# Patient Record
Sex: FEMALE | Race: WHITE | Employment: UNEMPLOYED | URBAN - METROPOLITAN AREA
[De-identification: names, ages, dates, MRNs, and addresses within clinical notes are randomized per-mention and may not be internally consistent; named-entity substitution may affect disease eponyms.]

---

## 2022-04-07 ENCOUNTER — TRANSCRIBE ORDER (OUTPATIENT)
Dept: SCHEDULING | Age: 37
End: 2022-04-07

## 2022-04-07 DIAGNOSIS — R07.9 CHEST PAIN, UNSPECIFIED: Primary | ICD-10-CM

## 2022-04-07 DIAGNOSIS — R22.2 LOCALIZED SWELLING, MASS AND LUMP, TRUNK: ICD-10-CM

## 2022-04-15 ENCOUNTER — HOSPITAL ENCOUNTER (OUTPATIENT)
Dept: CT IMAGING | Age: 37
Discharge: HOME | End: 2022-04-15
Attending: FAMILY MEDICINE

## 2022-04-15 DIAGNOSIS — R07.9 CHEST PAIN, UNSPECIFIED: ICD-10-CM

## 2022-04-15 DIAGNOSIS — R22.2 LOCALIZED SWELLING, MASS AND LUMP, TRUNK: ICD-10-CM

## 2022-11-03 ENCOUNTER — OFFICE VISIT (OUTPATIENT)
Dept: URGENT CARE | Age: 37
End: 2022-11-03
Payer: COMMERCIAL

## 2022-11-03 ENCOUNTER — APPOINTMENT (OUTPATIENT)
Dept: CT IMAGING | Age: 37
DRG: 872 | End: 2022-11-03
Attending: EMERGENCY MEDICINE
Payer: COMMERCIAL

## 2022-11-03 ENCOUNTER — HOSPITAL ENCOUNTER (INPATIENT)
Age: 37
LOS: 3 days | Discharge: HOME | DRG: 872 | End: 2022-11-07
Attending: EMERGENCY MEDICINE | Admitting: STUDENT IN AN ORGANIZED HEALTH CARE EDUCATION/TRAINING PROGRAM
Payer: COMMERCIAL

## 2022-11-03 VITALS
OXYGEN SATURATION: 99 % | DIASTOLIC BLOOD PRESSURE: 99 MMHG | SYSTOLIC BLOOD PRESSURE: 157 MMHG | TEMPERATURE: 97.6 F | WEIGHT: 170 LBS | HEART RATE: 103 BPM | BODY MASS INDEX: 28.29 KG/M2 | RESPIRATION RATE: 16 BRPM

## 2022-11-03 DIAGNOSIS — L03.114 LEFT ARM CELLULITIS: Primary | ICD-10-CM

## 2022-11-03 DIAGNOSIS — F19.11 HISTORY OF INTRAVENOUS DRUG ABUSE (HCC): ICD-10-CM

## 2022-11-03 DIAGNOSIS — R65.20 SEVERE SEPSIS (HCC): ICD-10-CM

## 2022-11-03 DIAGNOSIS — R60.0 ARM EDEMA: ICD-10-CM

## 2022-11-03 DIAGNOSIS — Z94.5 H/O SKIN GRAFT: ICD-10-CM

## 2022-11-03 DIAGNOSIS — A41.9 SEVERE SEPSIS (HCC): ICD-10-CM

## 2022-11-03 LAB
ALBUMIN SERPL-MCNC: 3.4 G/DL (ref 3.5–5)
ALBUMIN/GLOB SERPL: 0.6 {RATIO} (ref 1.1–2.2)
ALP SERPL-CCNC: 190 U/L (ref 45–117)
ALT SERPL-CCNC: 21 U/L (ref 12–78)
ANION GAP SERPL CALC-SCNC: 11 MMOL/L (ref 5–15)
AST SERPL-CCNC: 18 U/L (ref 15–37)
BASOPHILS # BLD: 0 K/UL (ref 0–0.1)
BASOPHILS NFR BLD: 0 % (ref 0–1)
BILIRUB SERPL-MCNC: 0.7 MG/DL (ref 0.2–1)
BUN SERPL-MCNC: 15 MG/DL (ref 6–20)
BUN/CREAT SERPL: 14 (ref 12–20)
CALCIUM SERPL-MCNC: 10.3 MG/DL (ref 8.5–10.1)
CHLORIDE SERPL-SCNC: 101 MMOL/L (ref 97–108)
CO2 SERPL-SCNC: 26 MMOL/L (ref 21–32)
COMMENT, HOLDF: NORMAL
CREAT SERPL-MCNC: 1.08 MG/DL (ref 0.55–1.02)
DIFFERENTIAL METHOD BLD: ABNORMAL
EOSINOPHIL # BLD: 0 K/UL (ref 0–0.4)
EOSINOPHIL NFR BLD: 0 % (ref 0–7)
ERYTHROCYTE [DISTWIDTH] IN BLOOD BY AUTOMATED COUNT: 17.7 % (ref 11.5–14.5)
GLOBULIN SER CALC-MCNC: 5.5 G/DL (ref 2–4)
GLUCOSE SERPL-MCNC: 127 MG/DL (ref 65–100)
HCT VFR BLD AUTO: 43.6 % (ref 35–47)
HGB BLD-MCNC: 14.2 G/DL (ref 11.5–16)
IMM GRANULOCYTES # BLD AUTO: 0.1 K/UL (ref 0–0.04)
IMM GRANULOCYTES NFR BLD AUTO: 0 % (ref 0–0.5)
LACTATE BLD-SCNC: 2.91 MMOL/L (ref 0.4–2)
LYMPHOCYTES # BLD: 1.3 K/UL (ref 0.8–3.5)
LYMPHOCYTES NFR BLD: 10 % (ref 12–49)
MCH RBC QN AUTO: 26.4 PG (ref 26–34)
MCHC RBC AUTO-ENTMCNC: 32.6 G/DL (ref 30–36.5)
MCV RBC AUTO: 81 FL (ref 80–99)
MONOCYTES # BLD: 0.4 K/UL (ref 0–1)
MONOCYTES NFR BLD: 3 % (ref 5–13)
NEUTS SEG # BLD: 10.3 K/UL (ref 1.8–8)
NEUTS SEG NFR BLD: 87 % (ref 32–75)
NRBC # BLD: 0 K/UL (ref 0–0.01)
NRBC BLD-RTO: 0 PER 100 WBC
PLATELET # BLD AUTO: 235 K/UL (ref 150–400)
PMV BLD AUTO: 11 FL (ref 8.9–12.9)
POTASSIUM SERPL-SCNC: 3.3 MMOL/L (ref 3.5–5.1)
PROT SERPL-MCNC: 8.9 G/DL (ref 6.4–8.2)
RBC # BLD AUTO: 5.38 M/UL (ref 3.8–5.2)
SAMPLES BEING HELD,HOLD: NORMAL
SODIUM SERPL-SCNC: 138 MMOL/L (ref 136–145)
WBC # BLD AUTO: 12.1 K/UL (ref 3.6–11)

## 2022-11-03 PROCEDURE — 87040 BLOOD CULTURE FOR BACTERIA: CPT

## 2022-11-03 PROCEDURE — 73201 CT UPPER EXTREMITY W/DYE: CPT

## 2022-11-03 PROCEDURE — 96365 THER/PROPH/DIAG IV INF INIT: CPT

## 2022-11-03 PROCEDURE — 99203 OFFICE O/P NEW LOW 30 MIN: CPT | Performed by: NURSE PRACTITIONER

## 2022-11-03 PROCEDURE — 83605 ASSAY OF LACTIC ACID: CPT

## 2022-11-03 PROCEDURE — 74011000258 HC RX REV CODE- 258: Performed by: EMERGENCY MEDICINE

## 2022-11-03 PROCEDURE — 96361 HYDRATE IV INFUSION ADD-ON: CPT

## 2022-11-03 PROCEDURE — 85025 COMPLETE CBC W/AUTO DIFF WBC: CPT

## 2022-11-03 PROCEDURE — 36415 COLL VENOUS BLD VENIPUNCTURE: CPT

## 2022-11-03 PROCEDURE — 93005 ELECTROCARDIOGRAM TRACING: CPT

## 2022-11-03 PROCEDURE — 96375 TX/PRO/DX INJ NEW DRUG ADDON: CPT

## 2022-11-03 PROCEDURE — 74011250636 HC RX REV CODE- 250/636: Performed by: EMERGENCY MEDICINE

## 2022-11-03 PROCEDURE — 74011000636 HC RX REV CODE- 636: Performed by: EMERGENCY MEDICINE

## 2022-11-03 PROCEDURE — 96367 TX/PROPH/DG ADDL SEQ IV INF: CPT

## 2022-11-03 PROCEDURE — 80053 COMPREHEN METABOLIC PANEL: CPT

## 2022-11-03 PROCEDURE — 99285 EMERGENCY DEPT VISIT HI MDM: CPT

## 2022-11-03 RX ORDER — MORPHINE SULFATE 2 MG/ML
4 INJECTION, SOLUTION INTRAMUSCULAR; INTRAVENOUS
Status: COMPLETED | OUTPATIENT
Start: 2022-11-03 | End: 2022-11-03

## 2022-11-03 RX ORDER — PROPRANOLOL HYDROCHLORIDE 10 MG/1
10 TABLET ORAL 3 TIMES DAILY
COMMUNITY

## 2022-11-03 RX ORDER — TRAZODONE HYDROCHLORIDE 150 MG/1
150 TABLET ORAL
COMMUNITY

## 2022-11-03 RX ORDER — VANCOMYCIN 1.75 GRAM/500 ML IN 0.9 % SODIUM CHLORIDE INTRAVENOUS
1750
Status: COMPLETED | OUTPATIENT
Start: 2022-11-03 | End: 2022-11-04

## 2022-11-03 RX ADMIN — IOPAMIDOL 100 ML: 755 INJECTION, SOLUTION INTRAVENOUS at 23:51

## 2022-11-03 RX ADMIN — MORPHINE SULFATE 4 MG: 2 INJECTION, SOLUTION INTRAMUSCULAR; INTRAVENOUS at 23:19

## 2022-11-03 RX ADMIN — VANCOMYCIN HYDROCHLORIDE 1750 MG: 10 INJECTION, POWDER, LYOPHILIZED, FOR SOLUTION INTRAVENOUS at 23:20

## 2022-11-03 RX ADMIN — PIPERACILLIN AND TAZOBACTAM 4.5 G: 4; .5 INJECTION, POWDER, FOR SOLUTION INTRAVENOUS at 20:47

## 2022-11-03 RX ADMIN — SODIUM CHLORIDE 1000 ML: 9 INJECTION, SOLUTION INTRAVENOUS at 20:47
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
## 2022-11-04 ENCOUNTER — TELEPHONE (OUTPATIENT)
Dept: SURGERY | Age: 37
End: 2022-11-04

## 2022-11-04 PROBLEM — L03.90 CELLULITIS: Status: ACTIVE | Noted: 2022-11-04

## 2022-11-04 PROBLEM — A41.9 SEPSIS (HCC): Status: ACTIVE | Noted: 2022-11-04

## 2022-11-04 LAB
ALBUMIN SERPL-MCNC: 2.4 G/DL (ref 3.5–5)
ALBUMIN/GLOB SERPL: 0.6 {RATIO} (ref 1.1–2.2)
ALP SERPL-CCNC: 134 U/L (ref 45–117)
ALT SERPL-CCNC: 16 U/L (ref 12–78)
ANION GAP SERPL CALC-SCNC: 7 MMOL/L (ref 5–15)
APPEARANCE UR: ABNORMAL
AST SERPL-CCNC: 14 U/L (ref 15–37)
ATRIAL RATE: 138 BPM
BACTERIA URNS QL MICRO: ABNORMAL /HPF
BILIRUB SERPL-MCNC: 0.5 MG/DL (ref 0.2–1)
BILIRUB UR QL CFM: NEGATIVE
BUN SERPL-MCNC: 12 MG/DL (ref 6–20)
BUN/CREAT SERPL: 16 (ref 12–20)
CALCIUM SERPL-MCNC: 8.6 MG/DL (ref 8.5–10.1)
CALCULATED P AXIS, ECG09: 76 DEGREES
CALCULATED R AXIS, ECG10: 77 DEGREES
CALCULATED T AXIS, ECG11: -75 DEGREES
CAOX CRY URNS QL MICRO: ABNORMAL
CHLORIDE SERPL-SCNC: 105 MMOL/L (ref 97–108)
CK SERPL-CCNC: 16 U/L (ref 26–192)
CO2 SERPL-SCNC: 26 MMOL/L (ref 21–32)
COLOR UR: ABNORMAL
CREAT SERPL-MCNC: 0.73 MG/DL (ref 0.55–1.02)
CRP SERPL HS-MCNC: >9.5 MG/L
DIAGNOSIS, 93000: NORMAL
EPITH CASTS URNS QL MICRO: ABNORMAL /LPF
ERYTHROCYTE [DISTWIDTH] IN BLOOD BY AUTOMATED COUNT: 17.8 % (ref 11.5–14.5)
ERYTHROCYTE [SEDIMENTATION RATE] IN BLOOD: 60 MM/HR (ref 0–20)
GLOBULIN SER CALC-MCNC: 3.9 G/DL (ref 2–4)
GLUCOSE BLD STRIP.AUTO-MCNC: 129 MG/DL (ref 65–117)
GLUCOSE SERPL-MCNC: 104 MG/DL (ref 65–100)
GLUCOSE UR STRIP.AUTO-MCNC: NEGATIVE MG/DL
HCT VFR BLD AUTO: 32.7 % (ref 35–47)
HGB BLD-MCNC: 10.7 G/DL (ref 11.5–16)
HGB UR QL STRIP: NEGATIVE
KETONES UR QL STRIP.AUTO: 15 MG/DL
LACTATE BLD-SCNC: 3.13 MMOL/L (ref 0.4–2)
LACTATE SERPL-SCNC: 1.6 MMOL/L (ref 0.4–2)
LACTATE SERPL-SCNC: 2.6 MMOL/L (ref 0.4–2)
LEUKOCYTE ESTERASE UR QL STRIP.AUTO: ABNORMAL
MAGNESIUM SERPL-MCNC: 1.6 MG/DL (ref 1.6–2.4)
MCH RBC QN AUTO: 26.4 PG (ref 26–34)
MCHC RBC AUTO-ENTMCNC: 32.7 G/DL (ref 30–36.5)
MCV RBC AUTO: 80.5 FL (ref 80–99)
NITRITE UR QL STRIP.AUTO: NEGATIVE
NRBC # BLD: 0 K/UL (ref 0–0.01)
NRBC BLD-RTO: 0 PER 100 WBC
P-R INTERVAL, ECG05: 130 MS
PH UR STRIP: 5 [PH] (ref 5–8)
PLATELET # BLD AUTO: 188 K/UL (ref 150–400)
PMV BLD AUTO: 10.3 FL (ref 8.9–12.9)
POTASSIUM SERPL-SCNC: 3.3 MMOL/L (ref 3.5–5.1)
PROCALCITONIN SERPL-MCNC: 39.41 NG/ML
PROT SERPL-MCNC: 6.3 G/DL (ref 6.4–8.2)
PROT UR STRIP-MCNC: 30 MG/DL
Q-T INTERVAL, ECG07: 268 MS
QRS DURATION, ECG06: 84 MS
QTC CALCULATION (BEZET), ECG08: 406 MS
RBC # BLD AUTO: 4.06 M/UL (ref 3.8–5.2)
RBC #/AREA URNS HPF: ABNORMAL /HPF (ref 0–5)
SERVICE CMNT-IMP: ABNORMAL
SODIUM SERPL-SCNC: 138 MMOL/L (ref 136–145)
SP GR UR REFRACTOMETRY: 1.03 (ref 1–1.03)
UA: UC IF INDICATED,UAUC: ABNORMAL
UROBILINOGEN UR QL STRIP.AUTO: 1 EU/DL (ref 0.2–1)
VENTRICULAR RATE, ECG03: 138 BPM
WBC # BLD AUTO: 8.8 K/UL (ref 3.6–11)
WBC URNS QL MICRO: ABNORMAL /HPF (ref 0–4)

## 2022-11-04 PROCEDURE — 81001 URINALYSIS AUTO W/SCOPE: CPT

## 2022-11-04 PROCEDURE — 80053 COMPREHEN METABOLIC PANEL: CPT

## 2022-11-04 PROCEDURE — 83605 ASSAY OF LACTIC ACID: CPT

## 2022-11-04 PROCEDURE — 74011000258 HC RX REV CODE- 258: Performed by: STUDENT IN AN ORGANIZED HEALTH CARE EDUCATION/TRAINING PROGRAM

## 2022-11-04 PROCEDURE — 99222 1ST HOSP IP/OBS MODERATE 55: CPT | Performed by: SURGERY

## 2022-11-04 PROCEDURE — 96366 THER/PROPH/DIAG IV INF ADDON: CPT

## 2022-11-04 PROCEDURE — 74011000250 HC RX REV CODE- 250: Performed by: STUDENT IN AN ORGANIZED HEALTH CARE EDUCATION/TRAINING PROGRAM

## 2022-11-04 PROCEDURE — 96375 TX/PRO/DX INJ NEW DRUG ADDON: CPT

## 2022-11-04 PROCEDURE — 74011250636 HC RX REV CODE- 250/636: Performed by: EMERGENCY MEDICINE

## 2022-11-04 PROCEDURE — 82550 ASSAY OF CK (CPK): CPT

## 2022-11-04 PROCEDURE — 84145 PROCALCITONIN (PCT): CPT

## 2022-11-04 PROCEDURE — 36415 COLL VENOUS BLD VENIPUNCTURE: CPT

## 2022-11-04 PROCEDURE — 74011250637 HC RX REV CODE- 250/637: Performed by: STUDENT IN AN ORGANIZED HEALTH CARE EDUCATION/TRAINING PROGRAM

## 2022-11-04 PROCEDURE — 74011000258 HC RX REV CODE- 258: Performed by: INTERNAL MEDICINE

## 2022-11-04 PROCEDURE — 74011250636 HC RX REV CODE- 250/636: Performed by: STUDENT IN AN ORGANIZED HEALTH CARE EDUCATION/TRAINING PROGRAM

## 2022-11-04 PROCEDURE — 83735 ASSAY OF MAGNESIUM: CPT

## 2022-11-04 PROCEDURE — 85652 RBC SED RATE AUTOMATED: CPT

## 2022-11-04 PROCEDURE — 74011250637 HC RX REV CODE- 250/637: Performed by: INTERNAL MEDICINE

## 2022-11-04 PROCEDURE — 86141 C-REACTIVE PROTEIN HS: CPT

## 2022-11-04 PROCEDURE — 85027 COMPLETE CBC AUTOMATED: CPT

## 2022-11-04 PROCEDURE — 82962 GLUCOSE BLOOD TEST: CPT

## 2022-11-04 PROCEDURE — 65270000029 HC RM PRIVATE

## 2022-11-04 PROCEDURE — 74011250636 HC RX REV CODE- 250/636: Performed by: INTERNAL MEDICINE

## 2022-11-04 PROCEDURE — 99223 1ST HOSP IP/OBS HIGH 75: CPT | Performed by: INTERNAL MEDICINE

## 2022-11-04 RX ORDER — KETOROLAC TROMETHAMINE 30 MG/ML
15 INJECTION, SOLUTION INTRAMUSCULAR; INTRAVENOUS
Status: DISPENSED | OUTPATIENT
Start: 2022-11-04 | End: 2022-11-07

## 2022-11-04 RX ORDER — ACETAMINOPHEN 325 MG/1
650 TABLET ORAL
Status: DISCONTINUED | OUTPATIENT
Start: 2022-11-04 | End: 2022-11-07 | Stop reason: HOSPADM

## 2022-11-04 RX ORDER — PROPRANOLOL HYDROCHLORIDE 10 MG/1
10 TABLET ORAL 3 TIMES DAILY
Status: DISCONTINUED | OUTPATIENT
Start: 2022-11-04 | End: 2022-11-07 | Stop reason: HOSPADM

## 2022-11-04 RX ORDER — SODIUM CHLORIDE 9 MG/ML
75 INJECTION, SOLUTION INTRAVENOUS CONTINUOUS
Status: DISPENSED | OUTPATIENT
Start: 2022-11-04 | End: 2022-11-04

## 2022-11-04 RX ORDER — HYDROMORPHONE HYDROCHLORIDE 1 MG/ML
0.5 INJECTION, SOLUTION INTRAMUSCULAR; INTRAVENOUS; SUBCUTANEOUS
Status: COMPLETED | OUTPATIENT
Start: 2022-11-04 | End: 2022-11-05

## 2022-11-04 RX ORDER — POTASSIUM CHLORIDE 750 MG/1
40 TABLET, FILM COATED, EXTENDED RELEASE ORAL
Status: COMPLETED | OUTPATIENT
Start: 2022-11-04 | End: 2022-11-04

## 2022-11-04 RX ORDER — SODIUM CHLORIDE, SODIUM LACTATE, POTASSIUM CHLORIDE, CALCIUM CHLORIDE 600; 310; 30; 20 MG/100ML; MG/100ML; MG/100ML; MG/100ML
1000 INJECTION, SOLUTION INTRAVENOUS ONCE
Status: COMPLETED | OUTPATIENT
Start: 2022-11-04 | End: 2022-11-04

## 2022-11-04 RX ORDER — HYDROMORPHONE HYDROCHLORIDE 1 MG/ML
1 INJECTION, SOLUTION INTRAMUSCULAR; INTRAVENOUS; SUBCUTANEOUS
Status: COMPLETED | OUTPATIENT
Start: 2022-11-04 | End: 2022-11-04

## 2022-11-04 RX ORDER — DULOXETIN HYDROCHLORIDE 30 MG/1
60 CAPSULE, DELAYED RELEASE ORAL DAILY
Status: DISCONTINUED | OUTPATIENT
Start: 2022-11-04 | End: 2022-11-07 | Stop reason: HOSPADM

## 2022-11-04 RX ORDER — DIPHENHYDRAMINE HYDROCHLORIDE 50 MG/ML
25 INJECTION, SOLUTION INTRAMUSCULAR; INTRAVENOUS
Status: COMPLETED | OUTPATIENT
Start: 2022-11-04 | End: 2022-11-04

## 2022-11-04 RX ADMIN — SODIUM CHLORIDE, PRESERVATIVE FREE 20 MG: 5 INJECTION INTRAVENOUS at 21:58

## 2022-11-04 RX ADMIN — CEFEPIME 2 G: 2 INJECTION, POWDER, FOR SOLUTION INTRAVENOUS at 03:21

## 2022-11-04 RX ADMIN — SODIUM CHLORIDE, POTASSIUM CHLORIDE, SODIUM LACTATE AND CALCIUM CHLORIDE 1000 ML: 600; 310; 30; 20 INJECTION, SOLUTION INTRAVENOUS at 02:15

## 2022-11-04 RX ADMIN — SODIUM CHLORIDE, PRESERVATIVE FREE 20 MG: 5 INJECTION INTRAVENOUS at 08:10

## 2022-11-04 RX ADMIN — PROPRANOLOL HYDROCHLORIDE 10 MG: 10 TABLET ORAL at 15:56

## 2022-11-04 RX ADMIN — METHYLPREDNISOLONE SODIUM SUCCINATE 125 MG: 125 INJECTION, POWDER, FOR SOLUTION INTRAMUSCULAR; INTRAVENOUS at 05:47

## 2022-11-04 RX ADMIN — HYDROMORPHONE HYDROCHLORIDE 1 MG: 1 INJECTION, SOLUTION INTRAMUSCULAR; INTRAVENOUS; SUBCUTANEOUS at 01:33

## 2022-11-04 RX ADMIN — KETOROLAC TROMETHAMINE 15 MG: 30 INJECTION, SOLUTION INTRAMUSCULAR; INTRAVENOUS at 19:28

## 2022-11-04 RX ADMIN — DIPHENHYDRAMINE HYDROCHLORIDE 25 MG: 50 INJECTION, SOLUTION INTRAMUSCULAR; INTRAVENOUS at 05:49

## 2022-11-04 RX ADMIN — HYDROMORPHONE HYDROCHLORIDE 0.5 MG: 1 INJECTION, SOLUTION INTRAMUSCULAR; INTRAVENOUS; SUBCUTANEOUS at 08:09

## 2022-11-04 RX ADMIN — KETOROLAC TROMETHAMINE 15 MG: 30 INJECTION, SOLUTION INTRAMUSCULAR; INTRAVENOUS at 03:28

## 2022-11-04 RX ADMIN — KETOROLAC TROMETHAMINE 15 MG: 30 INJECTION, SOLUTION INTRAMUSCULAR; INTRAVENOUS at 12:00

## 2022-11-04 RX ADMIN — TRAZODONE HYDROCHLORIDE 150 MG: 100 TABLET ORAL at 03:40

## 2022-11-04 RX ADMIN — VANCOMYCIN HYDROCHLORIDE 1000 MG: 1 INJECTION, POWDER, LYOPHILIZED, FOR SOLUTION INTRAVENOUS at 11:40

## 2022-11-04 RX ADMIN — POTASSIUM CHLORIDE 40 MEQ: 750 TABLET, FILM COATED, EXTENDED RELEASE ORAL at 05:38

## 2022-11-04 RX ADMIN — SODIUM CHLORIDE, PRESERVATIVE FREE 20 MG: 5 INJECTION INTRAVENOUS at 03:29

## 2022-11-04 RX ADMIN — SODIUM CHLORIDE 75 ML/HR: 9 INJECTION, SOLUTION INTRAVENOUS at 04:29

## 2022-11-04 RX ADMIN — TRAZODONE HYDROCHLORIDE 150 MG: 100 TABLET ORAL at 21:59

## 2022-11-04 RX ADMIN — DULOXETINE HYDROCHLORIDE 60 MG: 30 CAPSULE, DELAYED RELEASE ORAL at 09:29

## 2022-11-04 RX ADMIN — PROPRANOLOL HYDROCHLORIDE 10 MG: 10 TABLET ORAL at 21:58

## 2022-11-04 RX ADMIN — CEFEPIME 2 G: 2 INJECTION, POWDER, FOR SOLUTION INTRAVENOUS at 13:10

## 2022-11-04 RX ADMIN — PROPRANOLOL HYDROCHLORIDE 10 MG: 10 TABLET ORAL at 09:29

## 2022-11-04 RX ADMIN — PIPERACILLIN AND TAZOBACTAM 3.38 G: 3; .375 INJECTION, POWDER, FOR SOLUTION INTRAVENOUS at 19:28

## 2022-11-04 RX ADMIN — HYDROMORPHONE HYDROCHLORIDE 0.5 MG: 1 INJECTION, SOLUTION INTRAMUSCULAR; INTRAVENOUS; SUBCUTANEOUS at 16:04

## 2022-11-04 NOTE — PROGRESS NOTES
Pharmacy Antimicrobial Kinetic Dosing    Indication for Antimicrobials:  cellulitis, sepsis    Current Regimen of Each Antimicrobial:  Cefepime 2 gm IV q8h (Start Date 11/3; Day # 1)  Vancomycin 1750 mg IV once, then 1000 mg IV q12h (Start Date 11/3; Day # 1)    Previous Antimicrobial Therapy:  Zosyn 4.5 gm x1 dose 11/3    Goal Level: AUC: 400-600 mg/hr/Liter/day    Date Dose & Interval Measured (mcg/mL) Predicted AUC/CURT                       Dosing calculator used: [employer] calculator    Significant Positive Cultures:   11/3 blood: pending    Labs:  Recent Labs     22   CREA 1.08*   BUN 15     Recent Labs     22   WBC 12.1*     Temp (24hrs), Av.1 °F (36.7 °C), Min:97.6 °F (36.4 °C), Max:98.4 °F (36.9 °C)    Creatinine Clearance (mL/min):   CrCl (Adjusted Body Weight): 75.3 mL/min    Impression/Plan:   Cefepime 2 gm IV q8h per extended infusion beta lactam protocol   Vancomycin 1750 mg loading dose, followed by 1000 mg IV q12h (predicted , predicted trough 17.4 mcg/ml)  Antimicrobial stop date TBD     Pharmacy will follow daily and adjust medications as appropriate for renal function and/or serum levels.     Thank you,  Mariela Bailey, PHARMD

## 2022-11-04 NOTE — DISCHARGE INSTRUCTIONS
DISCHARGE DIAGNOSIS:  Acute sepsis, likely secondary to arm cellulitis -   Acute hypokalemia  IV drug abuse  Hypertension      MEDICATIONS:  It is important that you take the medication exactly as they are prescribed. Keep your medication in the bottles provided by the pharmacist and keep a list of the medication names, dosages, and times to be taken in your wallet. Do not take other medications without consulting your doctor. Pain Management: per above medications    What to do at Home    Recommended diet:  Regular Diet    Recommended activity: Activity as tolerated  Daily Wound care for the left forearm:  Remove the old dressings and note / document the amount and type of drainage on the dressings. Cleanse the wounds with Caraklenz spray and wipe with gauze. Apply NS moist pieces of the Opticel Ag placed into the open wounds and then cover with two ABD pads. Wrap with Small roll karyna and then an ACE bandage. Elevate the arm to decrease the edema. If you have questions regarding the hospital related prescriptions or hospital related issues please call [address] [place] [address] at . You can always direct your questions to your primary care doctor if you are unable to reach your hospital physician; your PCP works as an extension of your hospital doctor just like your hospital doctor is an extension of your PCP for your time at the hospital [facility], [facility]).     If you experience any of the following symptoms then please call your primary care physician or return to the emergency room if you cannot get hold of your doctor:  Fever, chills, nausea, vomiting, diarrhea, change in mentation, falling, bleeding, shortness of breath

## 2022-11-04 NOTE — PROGRESS NOTES
TRANSFER - OUT REPORT:    Verbal report given to YUMI Norton(name) on Joey Vargas  being transferred to surg tele(unit) for routine progression of care       Report consisted of patients Situation, Background, Assessment and   Recommendations(SBAR). Information from the following report(s) SBAR, Kardex, Procedure Summary, Intake/Output, MAR, Accordion, and Recent Results was reviewed with the receiving nurse. Lines:   Peripheral IV 11/03/22 Right Antecubital (Active)   Site Assessment Clean, dry, & intact 11/03/22 2210   Phlebitis Assessment 0 11/03/22 2210   Infiltration Assessment 0 11/03/22 2210   Dressing Status Clean;Dry; Intact 11/03/22 2210   Dressing Type Tape;Transparent 11/03/22 1931   Hub Color/Line Status Infusing;Capped 11/03/22 2210   Action Taken Blood drawn 11/03/22 1931        Opportunity for questions and clarification was provided.       Patient transported with:   [employer]othing
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
## 2022-11-04 NOTE — PROGRESS NOTES
Physical Therapy    Received PT eval order, chart reviewed. Pt up ad shlomo in ED and without mobility issues. Pt has no concerns from PT standpoint. Will sign off.     Ryan  PT

## 2022-11-04 NOTE — TELEPHONE ENCOUNTER
Attempted to reach ER nurse Celina Viveros re: consult. No answer. Dr. Niki Longo is in clinic and will see pt between morning and afternoon clinic.
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
## 2022-11-04 NOTE — PROGRESS NOTES
TRANSFER - IN REPORT:    Verbal report received from Kati Corona RN(name) on Daja Reagan  being received from ED(unit) for routine progression of care      Report consisted of patients Situation, Background, Assessment and   Recommendations(SBAR). Information from the following report(s) ED Summary, Procedure Summary, and Intake/Output was reviewed with the receiving nurse. Opportunity for questions and clarification was provided. Assessment completed upon patients arrival to unit and care assumed.

## 2022-11-04 NOTE — ED NOTES
SBAR given to oncoming shift nurse by this RN. All questions answered at this time. Care transitioned.

## 2022-11-04 NOTE — PROGRESS NOTES
Chart reviewed. 32 yo F adm for sepsis from arm cellulitis. She states swelling is down at time of encounter and she is able to move her fingers on R hand better. Discussed plan for surgery and ID evaluation and she is agreeable.
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
## 2022-11-04 NOTE — PROGRESS NOTES
Occupational Therapy  11/4/2022    Orders received and chart reviewed. Screened pt for OT evaluation. Pt up ad shlomo in room and without concerns for safety in ADLs. Discussed outpatient hand therapy as a future option if necessary with pt indicating understanding. Pt without needs for skilled therapy in the acute care setting. Please reconsult if functional changes arise.      Thank you,   Queen Domi, OTR/L
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
## 2022-11-05 LAB
ALBUMIN SERPL-MCNC: 2.5 G/DL (ref 3.5–5)
ALBUMIN/GLOB SERPL: 0.6 {RATIO} (ref 1.1–2.2)
ALP SERPL-CCNC: 141 U/L (ref 45–117)
ALT SERPL-CCNC: 16 U/L (ref 12–78)
ANION GAP SERPL CALC-SCNC: 5 MMOL/L (ref 5–15)
AST SERPL-CCNC: 20 U/L (ref 15–37)
BASOPHILS # BLD: 0 K/UL (ref 0–0.1)
BASOPHILS NFR BLD: 0 % (ref 0–1)
BILIRUB SERPL-MCNC: 1.1 MG/DL (ref 0.2–1)
BUN SERPL-MCNC: 12 MG/DL (ref 6–20)
BUN/CREAT SERPL: 16 (ref 12–20)
CALCIUM SERPL-MCNC: 8.9 MG/DL (ref 8.5–10.1)
CHLORIDE SERPL-SCNC: 110 MMOL/L (ref 97–108)
CO2 SERPL-SCNC: 24 MMOL/L (ref 21–32)
CREAT SERPL-MCNC: 0.75 MG/DL (ref 0.55–1.02)
DIFFERENTIAL METHOD BLD: ABNORMAL
EOSINOPHIL # BLD: 0 K/UL (ref 0–0.4)
EOSINOPHIL NFR BLD: 0 % (ref 0–7)
ERYTHROCYTE [DISTWIDTH] IN BLOOD BY AUTOMATED COUNT: 18.5 % (ref 11.5–14.5)
ERYTHROCYTE [DISTWIDTH] IN BLOOD BY AUTOMATED COUNT: 18.8 % (ref 11.5–14.5)
GLOBULIN SER CALC-MCNC: 4.3 G/DL (ref 2–4)
GLUCOSE SERPL-MCNC: 121 MG/DL (ref 65–100)
HCT VFR BLD AUTO: 31.5 % (ref 35–47)
HCT VFR BLD AUTO: 32.1 % (ref 35–47)
HGB BLD-MCNC: 10.2 G/DL (ref 11.5–16)
HGB BLD-MCNC: 10.5 G/DL (ref 11.5–16)
IMM GRANULOCYTES # BLD AUTO: 0.1 K/UL (ref 0–0.04)
IMM GRANULOCYTES NFR BLD AUTO: 1 % (ref 0–0.5)
LYMPHOCYTES # BLD: 1.9 K/UL (ref 0.8–3.5)
LYMPHOCYTES NFR BLD: 18 % (ref 12–49)
MCH RBC QN AUTO: 26.1 PG (ref 26–34)
MCH RBC QN AUTO: 26.3 PG (ref 26–34)
MCHC RBC AUTO-ENTMCNC: 32.4 G/DL (ref 30–36.5)
MCHC RBC AUTO-ENTMCNC: 32.7 G/DL (ref 30–36.5)
MCV RBC AUTO: 80.3 FL (ref 80–99)
MCV RBC AUTO: 80.6 FL (ref 80–99)
MONOCYTES # BLD: 0.5 K/UL (ref 0–1)
MONOCYTES NFR BLD: 4 % (ref 5–13)
NEUTS SEG # BLD: 8.3 K/UL (ref 1.8–8)
NEUTS SEG NFR BLD: 77 % (ref 32–75)
NRBC # BLD: 0 K/UL (ref 0–0.01)
NRBC # BLD: 0 K/UL (ref 0–0.01)
NRBC BLD-RTO: 0 PER 100 WBC
NRBC BLD-RTO: 0 PER 100 WBC
PLATELET # BLD AUTO: 236 K/UL (ref 150–400)
PLATELET # BLD AUTO: 253 K/UL (ref 150–400)
PMV BLD AUTO: 10.1 FL (ref 8.9–12.9)
PMV BLD AUTO: 9.8 FL (ref 8.9–12.9)
POTASSIUM SERPL-SCNC: 4.4 MMOL/L (ref 3.5–5.1)
PROT SERPL-MCNC: 6.8 G/DL (ref 6.4–8.2)
RBC # BLD AUTO: 3.91 M/UL (ref 3.8–5.2)
RBC # BLD AUTO: 4 M/UL (ref 3.8–5.2)
SODIUM SERPL-SCNC: 139 MMOL/L (ref 136–145)
WBC # BLD AUTO: 10.7 K/UL (ref 3.6–11)
WBC # BLD AUTO: 12.6 K/UL (ref 3.6–11)

## 2022-11-05 PROCEDURE — 74011250637 HC RX REV CODE- 250/637: Performed by: INTERNAL MEDICINE

## 2022-11-05 PROCEDURE — 80053 COMPREHEN METABOLIC PANEL: CPT

## 2022-11-05 PROCEDURE — 65270000029 HC RM PRIVATE

## 2022-11-05 PROCEDURE — 74011250636 HC RX REV CODE- 250/636: Performed by: STUDENT IN AN ORGANIZED HEALTH CARE EDUCATION/TRAINING PROGRAM

## 2022-11-05 PROCEDURE — 74011250636 HC RX REV CODE- 250/636: Performed by: INTERNAL MEDICINE

## 2022-11-05 PROCEDURE — 85025 COMPLETE CBC W/AUTO DIFF WBC: CPT

## 2022-11-05 PROCEDURE — 76937 US GUIDE VASCULAR ACCESS: CPT

## 2022-11-05 PROCEDURE — 74011000258 HC RX REV CODE- 258: Performed by: INTERNAL MEDICINE

## 2022-11-05 PROCEDURE — 85027 COMPLETE CBC AUTOMATED: CPT

## 2022-11-05 PROCEDURE — 74011250637 HC RX REV CODE- 250/637: Performed by: STUDENT IN AN ORGANIZED HEALTH CARE EDUCATION/TRAINING PROGRAM

## 2022-11-05 PROCEDURE — 36415 COLL VENOUS BLD VENIPUNCTURE: CPT

## 2022-11-05 PROCEDURE — 99231 SBSQ HOSP IP/OBS SF/LOW 25: CPT | Performed by: SURGERY

## 2022-11-05 PROCEDURE — 74011000250 HC RX REV CODE- 250: Performed by: STUDENT IN AN ORGANIZED HEALTH CARE EDUCATION/TRAINING PROGRAM

## 2022-11-05 RX ORDER — DIPHENHYDRAMINE HCL 25 MG
25 CAPSULE ORAL
Status: DISCONTINUED | OUTPATIENT
Start: 2022-11-05 | End: 2022-11-06

## 2022-11-05 RX ORDER — DULOXETIN HYDROCHLORIDE 60 MG/1
60 CAPSULE, DELAYED RELEASE ORAL DAILY
COMMUNITY
End: 2022-11-14

## 2022-11-05 RX ADMIN — DIPHENHYDRAMINE HYDROCHLORIDE 25 MG: 25 CAPSULE ORAL at 13:18

## 2022-11-05 RX ADMIN — HYDROMORPHONE HYDROCHLORIDE 0.5 MG: 1 INJECTION, SOLUTION INTRAMUSCULAR; INTRAVENOUS; SUBCUTANEOUS at 00:08

## 2022-11-05 RX ADMIN — KETOROLAC TROMETHAMINE 15 MG: 30 INJECTION, SOLUTION INTRAMUSCULAR; INTRAVENOUS at 10:03

## 2022-11-05 RX ADMIN — VANCOMYCIN HYDROCHLORIDE 1000 MG: 1 INJECTION, POWDER, LYOPHILIZED, FOR SOLUTION INTRAVENOUS at 10:00

## 2022-11-05 RX ADMIN — PIPERACILLIN AND TAZOBACTAM 3.38 G: 3; .375 INJECTION, POWDER, FOR SOLUTION INTRAVENOUS at 04:44

## 2022-11-05 RX ADMIN — PROPRANOLOL HYDROCHLORIDE 10 MG: 10 TABLET ORAL at 15:50

## 2022-11-05 RX ADMIN — PROPRANOLOL HYDROCHLORIDE 10 MG: 10 TABLET ORAL at 09:57

## 2022-11-05 RX ADMIN — SODIUM CHLORIDE, PRESERVATIVE FREE 20 MG: 5 INJECTION INTRAVENOUS at 22:09

## 2022-11-05 RX ADMIN — PROPRANOLOL HYDROCHLORIDE 10 MG: 10 TABLET ORAL at 21:59

## 2022-11-05 RX ADMIN — SODIUM CHLORIDE, PRESERVATIVE FREE 20 MG: 5 INJECTION INTRAVENOUS at 09:57

## 2022-11-05 RX ADMIN — PIPERACILLIN AND TAZOBACTAM 3.38 G: 3; .375 INJECTION, POWDER, FOR SOLUTION INTRAVENOUS at 12:33

## 2022-11-05 RX ADMIN — VANCOMYCIN HYDROCHLORIDE 1000 MG: 1 INJECTION, POWDER, LYOPHILIZED, FOR SOLUTION INTRAVENOUS at 00:08

## 2022-11-05 RX ADMIN — Medication 1 CAPSULE: at 09:57

## 2022-11-05 RX ADMIN — DULOXETINE HYDROCHLORIDE 60 MG: 30 CAPSULE, DELAYED RELEASE ORAL at 09:57

## 2022-11-05 RX ADMIN — PIPERACILLIN AND TAZOBACTAM 3.38 G: 3; .375 INJECTION, POWDER, FOR SOLUTION INTRAVENOUS at 19:35

## 2022-11-05 RX ADMIN — KETOROLAC TROMETHAMINE 15 MG: 30 INJECTION, SOLUTION INTRAMUSCULAR; INTRAVENOUS at 22:21

## 2022-11-05 RX ADMIN — KETOROLAC TROMETHAMINE 15 MG: 30 INJECTION, SOLUTION INTRAMUSCULAR; INTRAVENOUS at 15:50

## 2022-11-05 RX ADMIN — TRAZODONE HYDROCHLORIDE 150 MG: 100 TABLET ORAL at 22:01

## 2022-11-05 NOTE — PROGRESS NOTES
SURGERY PROGRESS NOTE      Admit Date: 11/3/2022    POD * No surgery found *    Procedure: * No surgery found *      Subjective:     Patient has no complaints. However, when asked to show me her infected arm, she raised her right arm and stated it was better. Objective:     Visit Vitals  /77   Pulse 75   Temp 97.6 °F (36.4 °C)   Resp 18   Ht 5' 5\" (1.651 m)   Wt 77 kg (169 lb 12.1 oz)   LMP 10/08/2022   SpO2 100%   Breastfeeding No   BMI 28.25 kg/m²        Temp (24hrs), Av °F (36.7 °C), Min:97.6 °F (36.4 °C), Max:98.4 °F (36.9 °C)      No intake/output data recorded. No intake/output data recorded. Physical Exam:    General:  alert, cooperative, no distress, appears stated age   Extremities:   1) right arm -  no signs infection, edema or swelling. Well healed skin grafts with small areas of exposed dermis at the junctions    2)  left arm - dressing removed. Cellulitis resolved. Wounds clean. Lab Results   Component Value Date/Time    WBC 12.6 (H) 2022 03:19 AM    HGB 10.5 (L) 2022 03:19 AM    Hemoglobin (POC) 11.7 2018 12:39 PM    HCT 32.1 (L) 2022 03:19 AM    PLATELET 587  03:19 AM    MCV 80.3 2022 03:19 AM     Lab Results   Component Value Date/Time    Creatinine 0.75 2022 03:19 AM    BUN 12 2022 03:19 AM    Sodium 139 2022 03:19 AM    Potassium 4.4 2022 03:19 AM    Chloride 110 (H) 2022 03:19 AM    CO2 24 2022 03:19 AM    Magnesium 1.6 2022 07:57 AM       Assessment:     Active Problems:    Cellulitis (2022)      Sepsis ([address] (2022)    Cellulitis resolved. No need for surgery.       Plan:       Continue present treatment  Will see PRN

## 2022-11-05 NOTE — PROGRESS NOTES
Transition of Care Plan:    RUR: 5%  Disposition: Home with Mother   *OP [facility] Care Clinic vs. HH SN (pt prefers OP clinic)  Follow up appointments: PCP & specialists as indicated  DME needed: None  Transportation at Discharge: Mother  [address] or means to access home: Yes     IM Medicare Letter: N/a  Is patient a  and connected with the [place]? N/a             If yes, was [place] transfer form completed and [facility] notified? N/a  Caregiver Contact: Piper Schmidt, mother ([phone])  Discharge Caregiver contacted prior to discharge? Pt declined  Care Conference needed?:  No                Reason for Admission:  Sepsis; Cellulitis                   RUR Score:  5%                   Plan for utilizing home health: PT/OT signed off, no recs. Wound Care following, back Monday. Pt would prefer OP Wound Care Clinic vs.  SN.         PCP: First and Last name:  Sherry Rodriguez MD     Name of Practice:    Are you a current patient: Yes/No: Yes   Approximate date of last visit: 2 months ago   Can you participate in a virtual visit with your PCP: Yes                    Current Advanced Directive/Advance Care Plan: No Order  Advance Care Planning   General Advance Care Planning (ACP) Conversation    Date of Conversation: 11/5/22  Conducted with: Patient with Decision Making Capacity    Healthcare Decision Maker:     Primary Decision Maker: Shruti Driver - Mother - [phone]  Click here to complete [address] including selection of the [address] Relationship (ie \"Primary\")    Today we documented Decision Maker(s) consistent with Legal Next of Kin hierarchy.     Content/Action Overview:   DECLINED ACP conversation - will revisit periodically   Reviewed DNR/DNI and patient elects Full Code (Attempt Resuscitation)  Length of Voluntary ACP Conversation in minutes:  <16 minutes (Non-Billable)  Esther Amaya               Transition of Care Plan:           - OP Wound Care Clinic vs.  SN  - Follow-up appnts  - Mother to transport home    30 YO White  Female admitted on 11/3/22 for Sepsis, Cellulitis. Spouse passed away in February 2022. Moved into her mother's home in [place] ([address], [place], [address]) that is 3-stories including basement (3 [address]). Lives on main level with full bath. 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 Denies hx of HH, SNF, IPR, or DME. Independent with mobility, ADLs/IADLs to include driving. Preferred Rx is [facility] [place]. Has [place] [facility]. Demographic info verified, assessment completed. Updated demographic sheet in pt station. PT/OT consults, but signed off with no recommendations. Wound Care following, will be back on Monday. MD awaiting ID recommendations, possible d/c Monday. Pt reported she would prefer to be setup with [facility] Care Clinic rather than [facility] as she is not homebound, but also due to the number of animals/pets they have at their home. Likely will not be able to setup [address] until Monday due to weekend. Pt verbalized understanding. Mother to transport home at d/c. CM will continue to remain available to assist with d/c planning needs             Care Management Interventions  PCP Verified by CM: Yes  Palliative Care Criteria Met (RRAT>21 & CHF Dx)?: No  Mode of Transport at Discharge:  Other (see comment) (mother)  Transition of Care Consult (CM Consult): Discharge Planning  Discharge Durable Medical Equipment: No  Health Maintenance Reviewed: Yes  Physical Therapy Consult: Yes  Occupational Therapy Consult: Yes  Speech Therapy Consult: No  Support Systems: Parent(s)  Confirm Follow Up Transport: Self  The Plan for Transition of Care is Related to the Following Treatment Goals :  [address] vs. HH SN  The Patient and/or Patient Representative was Provided with a Choice of Provider and Agrees with the Discharge Plan?: Yes  Name of the Patient Representative Who was Provided with a Choice of Provider and Agrees with the Discharge Plan: Bradly Mg (pt)  Discharge Location  Patient Expects to be Discharged to[de-identified] Home with outpatient services    Luis Rendon. [address] Manager  [phone]

## 2022-11-05 NOTE — PROGRESS NOTES
Problem: Falls - Risk of  Goal: *Absence of Falls  Description: Document Lizz Ground Fall Risk and appropriate interventions in the flowsheet.   Outcome: Progressing Towards Goal  Note: Fall Risk Interventions:            Medication Interventions: Patient to call before getting OOB, Teach patient to arise slowly                   Problem: Patient Education: Go to Patient Education Activity  Goal: Patient/Family Education  Outcome: Progressing Towards Goal     Problem: Pain  Goal: *Control of Pain  Outcome: Progressing Towards Goal     Problem: Patient Education: Go to Patient Education Activity  Goal: Patient/Family Education  Outcome: Progressing Towards Goal

## 2022-11-05 NOTE — PROGRESS NOTES
Endurance Catheter insertion note     Inserted 20 G, 8 cm long  Endurance Extended Dwell Peripheral Catheter into right upper arm using ultrasound guidance and sterile technique. Positive blood return. Patient tolerated procedure well. Denies questions or concerns at this time. Patient right forearm is swollen and tight, weeping around [facility] area, mid upper arm and below swollen as well from previous IV infusion per patient and RN confirmed the same. Advised RN to keep close observation to newly placed Endurance. Advised patient to inform RN if feels any pain or tightness  under axilla. Left arm is swollen due to cellulitis. The Endurance catheter is an extended dwell peripheral catheter and may remain in place 29 days. Blood samples can be obtained from this catheter. To obtain labs, a tourniquet may be used, flush with 10ml NS, waste 3ml, draw labs, flush with 20ml NS. Dressings needs to be changed with central line dressing kit using bio patch and stat lock every 7 days by nurse. Primary nurse, Kateryna Blanco, RN notified.           Jonathan Aguilar  RN BSN, Vascular Access Team. PICC Nurse

## 2022-11-05 NOTE — PROGRESS NOTES
Hospitalist Progress Note    NAME:  Jaylin Johnson   :  1985   MRN:  [id number]     Assessment / Plan:  Acute sepsis, likely secondary to arm cellulitis - improving  -Due to the patient's IVDA, cefepime and vancomycin  - changed to Vanc and Zosyn per ID  - spoke with ID 22, no changes in management  - Wound care to reeval 22  -Pain control with Dilaudid  -ESR, CRP elevated  -Warm compresses    Acute hypokalemia  -Replete as needed    IV drug abuse  -Patient does not take anything at home for this    Anxiety and depression  -Continue home trazodone    Hypertension  -Continue home propranolol    Estimated discharge date: 22  Barriers: Wound care to see Monday, ID clearance     Subjective:     Chief Complaint  L arm cellulitis    Subjective  Discussed with RN events overnight. Patient with no complaints this morning. L arm with bandages. Review of Systems:  14 point ROS reviewed with patient and negative except per above. Objective:     VITALS:   Last 24hrs VS reviewed since prior progress note. Most recent are:  Patient Vitals for the past 24 hrs:   Temp Pulse Resp BP SpO2   22 1204 98 °F (36.7 °C) 76 18 133/82 100 %   22 0854 97.6 °F (36.4 °C) 75 18 122/77 100 %   22 0440 97.9 °F (36.6 °C) 92 18 (!) 150/109 98 %   22 2330 98.1 °F (36.7 °C) 87 18 137/87 100 %   22 98.4 °F (36.9 °C) 94 17 (!) 149/102 100 %   22 98.4 °F (36.9 °C) 92 16 (!) 149/102 100 %     No intake or output data in the 24 hours ending 22 1514     I had a face to face encounter and independently examined this patient on 2022, as outlined below:    PHYSICAL EXAM:  General:   No acute distress, Answering questions appropriately  HEENT:  PERRL, EOMI, Anicteric sclera. MMM  Neck:   Supple  Resp:    CTAB, non-labored, No wheezes or crackles  Cardio:  regular rate, normal rhythm, no murmurs, no JVD  GI:    Soft, Non-tender, Non-distended, Normal bowel sounds. Extremities:  L arm with scars and open wounds, no obvious drainage. Warm, well perfused  Skin:    L arm noted above. Otherwise Warm, Dry, Pink, Intact. Neurologic:   Alert and Oriented x4, No focal defects    Reviewed most current lab test results and cultures  YES  Reviewed most current radiology test results   YES  Review and summation of old records today    NO  Reviewed patient's current orders and MAR    YES  PMH/SH reviewed - no change compared to H&P  ______________________________________________________________________    Procedures: see electronic medical records for all procedures/Xrays and details which were not copied into this note but were reviewed prior to creation of Plan. LABS:  I reviewed today's most current labs and imaging studies. Pertinent labs include:  Recent Labs     11/05/22  1126 11/05/22 0319 11/04/22 0317   WBC 10.7 12.6* 8.8   HGB 10.2* 10.5* 10.7*   HCT 31.5* 32.1* 32.7*    253 188     Recent Labs     11/05/22 0319 11/04/22  0757 11/04/22 0317 11/03/22  1933     --  138 138   K 4.4  --  3.3* 3.3*   *  --  105 101   CO2 24  --  26 26   *  --  104* 127*   BUN 12  --  12 15   CREA 0.75  --  0.73 1.08*   CA 8.9  --  8.6 10.3*   MG  --  1.6  --   --    ALB 2.5*  --  2.4* 3.4*   TBILI 1.1*  --  0.5 0.7   ALT 16  --  16 21           Care Plan discussed with:    Comments   Patient y    Family      RN y    Care Manager     Consultant                        Multidiciplinary team rounds were held today with , nursing, pharmacist and clinical coordinator. Patient's plan of care was discussed; medications were reviewed and discharge planning was addressed.      _    Signed: Azalea Glez MD

## 2022-11-05 NOTE — PROGRESS NOTES
Pharmacy Antimicrobial Kinetic Dosing    Indication for Antimicrobials:  cellulitis, sepsis    Current Regimen of Each Antimicrobial:  Zosyn 3.375g q 8h (start: , 14 days)  Vancomycin 1750 mg IV once, then 1000 mg IV q12h (Start Date 11/3; Day # 1)    Previous Antimicrobial Therapy:  Zosyn 4.5 gm x1 dose 11/3  Cefepime 11/3-    Goal Level: AUC: 400-600 mg/hr/Liter/day    Date Dose & Interval Measured (mcg/mL) Predicted AUC/CURT                       Date & time of next level:     Dosing calculator used: [employer] calculator    Significant Positive Cultures:   11/3 blood: pending    Conditions for Dosing Consideration:     Labs:  Recent Labs     22  1933   CREA 0.75 0.73 1.08*   BUN 12 12 15   PCT  --  39.41  --      Recent Labs     22  1126 22  1933   WBC 10.7 12.6* 8.8 12.1*     Temp (24hrs), Av °F (36.7 °C), Min:97.6 °F (36.4 °C), Max:98.4 °F (36.9 °C)    Creatinine Clearance (mL/min):   CrCl (Adjusted Body Weight): 108.5 mL/min    Impression/Plan:   Cefepime 2 gm IV q8h per extended infusion beta lactam protocol   Vancomycin 1750 mg loading dose, followed by 1000 mg IV q12h (predicted , predicted trough 17.4 mcg/ml)  Vancomycin trough on   Antimicrobial stop date TBD     Pharmacy will follow daily and adjust medications as appropriate for renal function and/or serum levels.     Thank you,  TIANA Schultz

## 2022-11-05 NOTE — PROGRESS NOTES
Bedside and Verbal shift change report given to James Palmer (oncoming nurse) by Stacey Phillips RN (offgoing nurse). Report included the following information SBAR, Kardex, Intake/Output, MAR, and Recent Results.

## 2022-11-05 NOTE — PROGRESS NOTES
[facility] of Shift Note    Bedside shift change report given to RN (oncoming nurse) by Myron Craig LPN (offgoing nurse). Report included the following information SBAR, Kardex, and MAR    Shift worked:  7a-7p     Shift summary and any significant changes:    PICC team inserted new endurance catheter due to infiltration of previous IV. Pain medication given as needed. Service dogYuliana, at bedside with patient. Concerns for physician to address: none     Zone phone for oncoming shift:  [phone]       Activity:  Activity Level: Up ad shlomo  Number times ambulated in hallways past shift: 0  Number of times OOB to chair past shift: 0    Cardiac:   Cardiac Monitoring: Yes      Cardiac Rhythm: Sinus Rhythm    Access:  Current line(s): PIV     Genitourinary:   Urinary status: voiding    Respiratory:   O2 Device: None (Room air)  Chronic home O2 use?: NO  Incentive spirometer at bedside: YES       GI:  Last Bowel Movement Date: 11/04/22  Current diet:  ADULT DIET Regular; Vegetarian (Lacto-Ovo)  Passing flatus: YES  Tolerating current diet: YES       Pain Management:   Patient states pain is manageable on current regimen: YES    Skin:  Luis Score: 23  Interventions: increase time out of bed and nutritional support     Patient Safety:  Fall Score:  Total Score: 1  Interventions: gripper socks       Length of Stay:  Expected LOS: 3d 12h  Actual LOS: 1      Myron Craig LPN

## 2022-11-05 NOTE — PROGRESS NOTES
Endurance Catheter insertion note     Inserted 20 G, 8 cm long  Endurance Extended Dwell Peripheral Catheter into right upper arm using ultrasound guidance and sterile technique. Positive blood return. Patient tolerated procedure well. Denies questions or concerns at this time. Left arm cant be use for access due to      The Endurance catheter is an extended dwell peripheral catheter and may remain in place 29 days. Blood samples can be obtained from this catheter. To obtain labs, a tourniquet may be used, flush with 10ml NS, waste 3ml, draw labs, flush with 20ml NS. Dressings needs to be changed with central line dressing kit using bio patch and stat lock every 7 days by nurse. Primary nurse, Tania Nicholson RN notified.           Bridgette Palma  RN BSN, Vascular Access Team. PICC Nurse

## 2022-11-06 LAB
ANION GAP SERPL CALC-SCNC: 8 MMOL/L (ref 5–15)
BACTERIA SPEC CULT: NORMAL
BACTERIA SPEC CULT: NORMAL
BASOPHILS # BLD: 0 K/UL (ref 0–0.1)
BASOPHILS NFR BLD: 0 % (ref 0–1)
BUN SERPL-MCNC: 8 MG/DL (ref 6–20)
BUN/CREAT SERPL: 14 (ref 12–20)
CALCIUM SERPL-MCNC: 8.6 MG/DL (ref 8.5–10.1)
CHLORIDE SERPL-SCNC: 109 MMOL/L (ref 97–108)
CO2 SERPL-SCNC: 24 MMOL/L (ref 21–32)
CREAT SERPL-MCNC: 0.59 MG/DL (ref 0.55–1.02)
DATE LAST DOSE: NORMAL
DIFFERENTIAL METHOD BLD: ABNORMAL
EOSINOPHIL # BLD: 0 K/UL (ref 0–0.4)
EOSINOPHIL NFR BLD: 1 % (ref 0–7)
ERYTHROCYTE [DISTWIDTH] IN BLOOD BY AUTOMATED COUNT: 18.8 % (ref 11.5–14.5)
GLUCOSE SERPL-MCNC: 89 MG/DL (ref 65–100)
HCT VFR BLD AUTO: 31.7 % (ref 35–47)
HGB BLD-MCNC: 10.2 G/DL (ref 11.5–16)
IMM GRANULOCYTES # BLD AUTO: 0 K/UL (ref 0–0.04)
IMM GRANULOCYTES NFR BLD AUTO: 0 % (ref 0–0.5)
LYMPHOCYTES # BLD: 2.9 K/UL (ref 0.8–3.5)
LYMPHOCYTES NFR BLD: 51 % (ref 12–49)
MCH RBC QN AUTO: 26.2 PG (ref 26–34)
MCHC RBC AUTO-ENTMCNC: 32.2 G/DL (ref 30–36.5)
MCV RBC AUTO: 81.5 FL (ref 80–99)
MONOCYTES # BLD: 0.3 K/UL (ref 0–1)
MONOCYTES NFR BLD: 5 % (ref 5–13)
NEUTS SEG # BLD: 2.4 K/UL (ref 1.8–8)
NEUTS SEG NFR BLD: 43 % (ref 32–75)
NRBC # BLD: 0 K/UL (ref 0–0.01)
NRBC BLD-RTO: 0 PER 100 WBC
PLATELET # BLD AUTO: 272 K/UL (ref 150–400)
PMV BLD AUTO: 9.5 FL (ref 8.9–12.9)
POTASSIUM SERPL-SCNC: 4 MMOL/L (ref 3.5–5.1)
RBC # BLD AUTO: 3.89 M/UL (ref 3.8–5.2)
REPORTED DOSE,DOSE: NORMAL UNITS
REPORTED DOSE/TIME,TMG: NORMAL
SERVICE CMNT-IMP: NORMAL
SODIUM SERPL-SCNC: 141 MMOL/L (ref 136–145)
VANCOMYCIN TROUGH SERPL-MCNC: 9.4 UG/ML (ref 5–10)
WBC # BLD AUTO: 5.7 K/UL (ref 3.6–11)

## 2022-11-06 PROCEDURE — 74011250637 HC RX REV CODE- 250/637: Performed by: INTERNAL MEDICINE

## 2022-11-06 PROCEDURE — 85025 COMPLETE CBC W/AUTO DIFF WBC: CPT

## 2022-11-06 PROCEDURE — 74011250636 HC RX REV CODE- 250/636: Performed by: INTERNAL MEDICINE

## 2022-11-06 PROCEDURE — 74011250637 HC RX REV CODE- 250/637: Performed by: STUDENT IN AN ORGANIZED HEALTH CARE EDUCATION/TRAINING PROGRAM

## 2022-11-06 PROCEDURE — 80202 ASSAY OF VANCOMYCIN: CPT

## 2022-11-06 PROCEDURE — 74011250636 HC RX REV CODE- 250/636: Performed by: STUDENT IN AN ORGANIZED HEALTH CARE EDUCATION/TRAINING PROGRAM

## 2022-11-06 PROCEDURE — 80048 BASIC METABOLIC PNL TOTAL CA: CPT

## 2022-11-06 PROCEDURE — 65270000029 HC RM PRIVATE

## 2022-11-06 PROCEDURE — 74011000258 HC RX REV CODE- 258: Performed by: INTERNAL MEDICINE

## 2022-11-06 PROCEDURE — 36415 COLL VENOUS BLD VENIPUNCTURE: CPT

## 2022-11-06 PROCEDURE — 74011000250 HC RX REV CODE- 250: Performed by: STUDENT IN AN ORGANIZED HEALTH CARE EDUCATION/TRAINING PROGRAM

## 2022-11-06 RX ORDER — DIPHENHYDRAMINE HCL 25 MG
50 CAPSULE ORAL
Status: DISCONTINUED | OUTPATIENT
Start: 2022-11-06 | End: 2022-11-07 | Stop reason: HOSPADM

## 2022-11-06 RX ORDER — TRAMADOL HYDROCHLORIDE 50 MG/1
50 TABLET ORAL
Status: DISCONTINUED | OUTPATIENT
Start: 2022-11-06 | End: 2022-11-07 | Stop reason: HOSPADM

## 2022-11-06 RX ADMIN — VANCOMYCIN HYDROCHLORIDE 1000 MG: 1 INJECTION, POWDER, LYOPHILIZED, FOR SOLUTION INTRAVENOUS at 23:15

## 2022-11-06 RX ADMIN — VANCOMYCIN HYDROCHLORIDE 1000 MG: 1 INJECTION, POWDER, LYOPHILIZED, FOR SOLUTION INTRAVENOUS at 11:23

## 2022-11-06 RX ADMIN — ACETAMINOPHEN 650 MG: 325 TABLET ORAL at 08:50

## 2022-11-06 RX ADMIN — ACETAMINOPHEN 650 MG: 325 TABLET ORAL at 12:55

## 2022-11-06 RX ADMIN — DIPHENHYDRAMINE HYDROCHLORIDE 50 MG: 25 CAPSULE ORAL at 13:19

## 2022-11-06 RX ADMIN — PIPERACILLIN AND TAZOBACTAM 3.38 G: 3; .375 INJECTION, POWDER, FOR SOLUTION INTRAVENOUS at 06:57

## 2022-11-06 RX ADMIN — PROPRANOLOL HYDROCHLORIDE 10 MG: 10 TABLET ORAL at 16:43

## 2022-11-06 RX ADMIN — TRAZODONE HYDROCHLORIDE 150 MG: 100 TABLET ORAL at 23:16

## 2022-11-06 RX ADMIN — TRAMADOL HYDROCHLORIDE 50 MG: 50 TABLET, COATED ORAL at 14:33

## 2022-11-06 RX ADMIN — PIPERACILLIN AND TAZOBACTAM 3.38 G: 3; .375 INJECTION, POWDER, FOR SOLUTION INTRAVENOUS at 23:17

## 2022-11-06 RX ADMIN — SODIUM CHLORIDE, PRESERVATIVE FREE 20 MG: 5 INJECTION INTRAVENOUS at 08:51

## 2022-11-06 RX ADMIN — PROPRANOLOL HYDROCHLORIDE 10 MG: 10 TABLET ORAL at 23:15

## 2022-11-06 RX ADMIN — DULOXETINE HYDROCHLORIDE 60 MG: 30 CAPSULE, DELAYED RELEASE ORAL at 08:50

## 2022-11-06 RX ADMIN — SODIUM CHLORIDE, PRESERVATIVE FREE 20 MG: 5 INJECTION INTRAVENOUS at 23:15

## 2022-11-06 RX ADMIN — VANCOMYCIN HYDROCHLORIDE 1000 MG: 1 INJECTION, POWDER, LYOPHILIZED, FOR SOLUTION INTRAVENOUS at 01:33

## 2022-11-06 RX ADMIN — PROPRANOLOL HYDROCHLORIDE 10 MG: 10 TABLET ORAL at 08:50

## 2022-11-06 RX ADMIN — KETOROLAC TROMETHAMINE 15 MG: 30 INJECTION, SOLUTION INTRAMUSCULAR; INTRAVENOUS at 14:33

## 2022-11-06 RX ADMIN — KETOROLAC TROMETHAMINE 15 MG: 30 INJECTION, SOLUTION INTRAMUSCULAR; INTRAVENOUS at 23:20

## 2022-11-06 RX ADMIN — Medication 1 CAPSULE: at 08:50

## 2022-11-06 RX ADMIN — KETOROLAC TROMETHAMINE 15 MG: 30 INJECTION, SOLUTION INTRAMUSCULAR; INTRAVENOUS at 08:50

## 2022-11-06 RX ADMIN — TRAMADOL HYDROCHLORIDE 50 MG: 50 TABLET, COATED ORAL at 23:19

## 2022-11-06 RX ADMIN — PIPERACILLIN AND TAZOBACTAM 3.38 G: 3; .375 INJECTION, POWDER, FOR SOLUTION INTRAVENOUS at 12:55

## 2022-11-06 NOTE — PROGRESS NOTES
Problem: Falls - Risk of  Goal: *Absence of Falls  Description: Document Stephy Skill Fall Risk and appropriate interventions in the flowsheet.   Outcome: Progressing Towards Goal  Note: Fall Risk Interventions:            Medication Interventions: Patient to call before getting OOB, Teach patient to arise slowly                   Problem: Patient Education: Go to Patient Education Activity  Goal: Patient/Family Education  Outcome: Progressing Towards Goal     Problem: Pain  Goal: *Control of Pain  Outcome: Progressing Towards Goal     Problem: Patient Education: Go to Patient Education Activity  Goal: Patient/Family Education  Outcome: Progressing Towards Goal     Problem: General Medical Care Plan  Goal: *Vital signs within specified parameters  Outcome: Progressing Towards Goal  Goal: *Labs within defined limits  Outcome: Progressing Towards Goal  Goal: *Absence of infection signs and symptoms  Outcome: Progressing Towards Goal  Goal: *Optimal pain control at patient's stated goal  Outcome: Progressing Towards Goal  Goal: *Skin integrity maintained  Outcome: Progressing Towards Goal  Goal: *Fluid volume balance  Outcome: Progressing Towards Goal  Goal: *Optimize nutritional status  Outcome: Progressing Towards Goal  Goal: *Anxiety reduced or absent  Outcome: Progressing Towards Goal  Goal: *Progressive mobility and function (eg: ADL's)  Outcome: Progressing Towards Goal     Problem: Patient Education: Go to Patient Education Activity  Goal: Patient/Family Education  Outcome: Progressing Towards Goal

## 2022-11-06 NOTE — PROGRESS NOTES
Pharmacy Antimicrobial Kinetic Dosing    Indication for Antimicrobials:  cellulitis, sepsis    Current Regimen of Each Antimicrobial:  Zosyn 3.375g q 8h (start: , 14 days)  Vancomycin 1750 mg IV once, then 1000 mg IV q12h (Start Date 11/3; Day # 4)    Previous Antimicrobial Therapy:  Zosyn 4.5 gm x1 dose 11/3  Cefepime 11/3-    Goal Level: AUC: 400-600 mg/hr/Liter/day    Date Dose & Interval Measured (mcg/mL) Predicted AUC/CURT    @  1000mg q 12h 9.4 327 /8.3                 Date & time of next level:     Dosing calculator used: [employer] calculator    Significant Positive Cultures:   11/3 blood: pending    Conditions for Dosing Consideration:     Labs:  Recent Labs     22  0755 22   CREA 0.59 0.75 0.73   BUN 8 12 12   PCT  --   --  39.41     Recent Labs     22  0755 22  1126 229 22   WBC 5.7 10.7 12.6* 8.8     Temp (24hrs), Av.5 °F (36.9 °C), Min:97.6 °F (36.4 °C), Max:99.6 °F (37.6 °C)    Creatinine Clearance (mL/min):   CrCl (Adjusted Body Weight): 116.2 mL/min    Impression/Plan:   Cefepime 2 gm IV q8h per extended infusion beta lactam protocol   Vancomycin 1750 mg loading dose, followed by 1000 mg IV q12h (predicted , predicted trough 17.4 mcg/ml)  Vancomycin trough resulted subtherapeutic, increase vancomycin to 1000mg q 8h  Antimicrobial stop date TBD     Pharmacy will follow daily and adjust medications as appropriate for renal function and/or serum levels.     Thank you,  Jorge Rosa, 68 [address]aeffer

## 2022-11-06 NOTE — PROGRESS NOTES
Hospitalist Progress Note    NAME:  Jose Torrez   :  1985   MRN:  [id number]     Assessment / Plan:  Acute sepsis, likely secondary to arm cellulitis - improving  -Due to the patient's IVDA, cefepime and vancomycin  - changed to Vanc and Zosyn per ID  - spoke with ID 22, no changes in management  - Wound care to reeval 22   as needed Tylenol and Toradol, will add tramadol 50 mg every 6  Avoid narcotics as much as possible  -ESR, CRP elevated  -Warm compresses   No surgical plans     Acute hypokalemia  -Replete as needed    IV drug abuse  -Patient does not take anything at home for this  Reported that she stopped doing drugs 6 months ago  Anxiety and depression  -Continue home trazodone    Hypertension  -Continue home propranolol    Estimated discharge date: 22  Barriers: Wound care to see Monday, ID clearance     Subjective:     Chief Complaint  L arm cellulitis  Complain of itchiness and pain on her left arm  discussed with RN events overnight L arm with bandages. Review of Systems:  14 point ROS reviewed with patient and negative except per above. Objective:     VITALS:   Last 24hrs VS reviewed since prior progress note.  Most recent are:  Patient Vitals for the past 24 hrs:   Temp Pulse Resp BP SpO2   22 0751 97.6 °F (36.4 °C) 68 16 (!) 135/92 98 %   22 0344 98.7 °F (37.1 °C) 63 16 (!) 144/90 100 %   22 2159 -- 86 -- (!) 149/86 --   22 2100 99.6 °F (37.6 °C) 77 16 122/61 100 %   22 1541 97.9 °F (36.6 °C) 73 16 (!) 148/89 100 %   22 1540 -- 69 18 (!) 151/93 98 %   22 1204 98 °F (36.7 °C) 76 18 133/82 100 %   22 0854 97.6 °F (36.4 °C) 75 18 122/77 100 %       No intake or output data in the 24 hours ending 22 0843     I had a face to face encounter and independently examined this patient on 2022, as outlined below:    PHYSICAL EXAM:  General:   No acute distress, Answering questions appropriately  HEENT:  PERRL, EOMI, Anicteric sclera. MMM  Neck:   Supple  Resp:    CTAB, non-labored, No wheezes or crackles  Cardio:  regular rate, normal rhythm, no murmurs, no JVD  GI:    Soft, Non-tender, Non-distended, Normal bowel sounds. Extremities:  L arm with scars and open wounds, no obvious drainage. Warm, well perfused  Skin:    L arm noted above. Otherwise Warm, Dry, Pink, Intact. Neurologic:   Alert and Oriented x4, No focal defects    Reviewed most current lab test results and cultures  YES  Reviewed most current radiology test results   YES  Review and summation of old records today    NO  Reviewed patient's current orders and MAR    YES  PMH/SH reviewed - no change compared to H&P  ______________________________________________________________________    Procedures: see electronic medical records for all procedures/Xrays and details which were not copied into this note but were reviewed prior to creation of Plan. LABS:  I reviewed today's most current labs and imaging studies. Pertinent labs include:  Recent Labs     11/06/22  0755 11/05/22  1126 11/05/22 0319   WBC 5.7 10.7 12.6*   HGB 10.2* 10.2* 10.5*   HCT 31.7* 31.5* 32.1*    236 253       Recent Labs     11/05/22 0319 11/04/22  0757 11/04/22 0317 11/03/22  1933     --  138 138   K 4.4  --  3.3* 3.3*   *  --  105 101   CO2 24  --  26 26   *  --  104* 127*   BUN 12  --  12 15   CREA 0.75  --  0.73 1.08*   CA 8.9  --  8.6 10.3*   MG  --  1.6  --   --    ALB 2.5*  --  2.4* 3.4*   TBILI 1.1*  --  0.5 0.7   ALT 16  --  16 21             Care Plan discussed with:    Comments   Patient y    Family      RN y    Care Manager     Consultant                        Multidiciplinary team rounds were held today with , nursing, pharmacist and clinical coordinator. Patient's plan of care was discussed; medications were reviewed and discharge planning was addressed.      _    Signed: Vianney Lynn MD

## 2022-11-06 NOTE — PROGRESS NOTES
[facility] of Shift Note    Bedside shift change report given to Chemo Miranda RN (oncoming nurse) by Rachael Vyas LPN (offgoing nurse). Report included the following information SBAR, Kardex, and MAR    Shift worked:  7a-7p     Shift summary and any significant changes:    Patient c/o pain and medicated PRN. Also c/o allergy symptoms-gave benadryl. Wound care completed. Patient expressing concerns over how swollen her hand is. Concerns for physician to address: none     Zone phone for oncoming shift:  [phone]       Activity:  Activity Level: Up ad shlomo  Number times ambulated in hallways past shift: 0  Number of times OOB to chair past shift: 0    Cardiac:   Cardiac Monitoring: No      Cardiac Rhythm: Sinus Rhythm    Access:  Current line(s): PIV     Genitourinary:   Urinary status: voiding    Respiratory:   O2 Device: None (Room air)  Chronic home O2 use?: NO  Incentive spirometer at bedside: NO       GI:  Last Bowel Movement Date: 11/03/22  Current diet:  ADULT DIET Regular; Vegetarian (Lacto-Ovo)  Passing flatus: YES  Tolerating current diet: YES       Pain Management:   Patient states pain is manageable on current regimen: YES    Skin:  Luis Score: 22  Interventions: increase time out of bed and nutritional support     Patient Safety:  Fall Score:  Total Score: 1  Interventions: gripper socks       Length of Stay:  Expected LOS: 3d 12h  Actual LOS: 2      Rachael Vyas LPN

## 2022-11-07 VITALS
WEIGHT: 169.75 LBS | DIASTOLIC BLOOD PRESSURE: 85 MMHG | OXYGEN SATURATION: 97 % | RESPIRATION RATE: 18 BRPM | HEART RATE: 61 BPM | HEIGHT: 65 IN | TEMPERATURE: 97.6 F | BODY MASS INDEX: 28.28 KG/M2 | SYSTOLIC BLOOD PRESSURE: 129 MMHG

## 2022-11-07 PROCEDURE — 74011250637 HC RX REV CODE- 250/637: Performed by: STUDENT IN AN ORGANIZED HEALTH CARE EDUCATION/TRAINING PROGRAM

## 2022-11-07 PROCEDURE — 74011250637 HC RX REV CODE- 250/637: Performed by: INTERNAL MEDICINE

## 2022-11-07 PROCEDURE — 74011000250 HC RX REV CODE- 250: Performed by: STUDENT IN AN ORGANIZED HEALTH CARE EDUCATION/TRAINING PROGRAM

## 2022-11-07 PROCEDURE — 74011000258 HC RX REV CODE- 258: Performed by: INTERNAL MEDICINE

## 2022-11-07 PROCEDURE — 74011250636 HC RX REV CODE- 250/636: Performed by: INTERNAL MEDICINE

## 2022-11-07 PROCEDURE — 99233 SBSQ HOSP IP/OBS HIGH 50: CPT | Performed by: INTERNAL MEDICINE

## 2022-11-07 PROCEDURE — 74011250636 HC RX REV CODE- 250/636: Performed by: STUDENT IN AN ORGANIZED HEALTH CARE EDUCATION/TRAINING PROGRAM

## 2022-11-07 RX ORDER — TRAMADOL HYDROCHLORIDE 50 MG/1
50 TABLET ORAL
Qty: 12 TABLET | Refills: 0 | Status: SHIPPED | OUTPATIENT
Start: 2022-11-07 | End: 2022-11-10

## 2022-11-07 RX ORDER — DOXYCYCLINE 100 MG/1
100 TABLET ORAL 2 TIMES DAILY
Qty: 14 TABLET | Refills: 0 | Status: SHIPPED | OUTPATIENT
Start: 2022-11-07 | End: 2022-11-14

## 2022-11-07 RX ADMIN — TRAMADOL HYDROCHLORIDE 50 MG: 50 TABLET, COATED ORAL at 08:57

## 2022-11-07 RX ADMIN — DULOXETINE HYDROCHLORIDE 60 MG: 30 CAPSULE, DELAYED RELEASE ORAL at 08:57

## 2022-11-07 RX ADMIN — SODIUM CHLORIDE, PRESERVATIVE FREE 20 MG: 5 INJECTION INTRAVENOUS at 08:58

## 2022-11-07 RX ADMIN — VANCOMYCIN HYDROCHLORIDE 1000 MG: 1 INJECTION, POWDER, LYOPHILIZED, FOR SOLUTION INTRAVENOUS at 05:21

## 2022-11-07 RX ADMIN — PROPRANOLOL HYDROCHLORIDE 10 MG: 10 TABLET ORAL at 08:57

## 2022-11-07 RX ADMIN — Medication 1 CAPSULE: at 08:57

## 2022-11-07 RX ADMIN — PIPERACILLIN AND TAZOBACTAM 3.38 G: 3; .375 INJECTION, POWDER, FOR SOLUTION INTRAVENOUS at 05:21

## 2022-11-07 NOTE — DISCHARGE SUMMARY
Hospitalist Discharge Summary     Patient ID:  Ilda Hernadez  [id number]  74 y.o.  1985  11/3/2022    PCP on record: Mimi Duron MD    Admit date: 11/3/2022  Discharge date and time: 11/7/2022    DISCHARGE DIAGNOSIS:  Acute sepsis, likely secondary to arm cellulitis -   Acute hypokalemia  IV drug abuse  Hypertensio      CONSULTATIONS:  IP CONSULT TO GENERAL SURGERY  IP CONSULT TO INFECTIOUS DISEASES    Excerpted HPI from H&P of Yovana Esteves, DO:      ______________________________________________________________________  DISCHARGE SUMMARY/HOSPITAL COURSE:  for full details see H&P, daily progress notes, labs, consult notes. Acute sepsis, likely secondary to arm cellulitis - improving  -Due to the patient's IVDA, cefepime and vancomycin, discharged on doxycycline  - changed to Vanc and Zosyn per ID  ID consulted  - Wound care to reeval 11/7/22   as needed Tylenol and Toradol, will add tramadol 50 mg every 6  Avoid narcotics as much as possible  -ESR, CRP elevated  -Warm compresses   No surgical plans     Acute hypokalemia  -Replete as needed    IV drug abuse  -Patient does not take anything at home for this  Reported that she stopped doing drugs 6 months ago  Anxiety and depression  -Continue home trazodone    Hypertension  -Continue home propranolol     _______________________________________________________________________  Patient seen and examined by me on discharge day. Pertinent Findings:  Gen:    Not in distress  Chest: Clear lungs  CVS:   Regular rhythm. No edema  Abd:  Soft, not distended, not tender  Neuro:  Alert, oriented x3  _______________________________________________________________________  DISCHARGE MEDICATIONS:   Current Discharge Medication List        START taking these medications    Details   doxycycline (ADOXA) 100 mg tablet Take 1 Tablet by mouth two (2) times a day for 7 days.   Qty: 14 Tablet, Refills: 0  Start date: 11/7/2022, End date: 11/14/2022 traMADoL (ULTRAM) 50 mg tablet Take 1 Tablet by mouth every six (6) hours as needed for Pain for up to 3 days. Max Daily Amount: 200 mg. Qty: 12 Tablet, Refills: 0  Start date: 11/7/2022, End date: 11/10/2022    Associated Diagnoses: Left arm cellulitis           CONTINUE these medications which have NOT CHANGED    Details   DULoxetine (CYMBALTA) 60 mg capsule Take 60 mg by mouth daily. propranoloL (INDERAL) 10 mg tablet Take 10 mg by mouth three (3) times daily. traZODone (DESYREL) 150 mg tablet Take 150 mg by mouth nightly. Patient Follow Up Instructions: Activity: Activity as tolerated  Diet: Regular Diet and Cardiac Diet  Wound Care:   Daily Wound care for the left forearm:  Remove the old dressings and note / document the amount and type of drainage on the dressings. Cleanse the wounds with Caraklenz spray and wipe with gauze. Apply NS moist pieces of the Opticel Ag placed into the open wounds and then cover with two ABD pads. Wrap with Small roll karyna and then an ACE bandage. Elevate the arm to decrease the edema.       Follow-up Information       Follow up With Specialties Details Why Kishore Dunn MD Family Medicine             ________________________________________________________________    Risk of deterioration: High    Condition at Discharge:  Stable  __________________________________________________________________    Disposition  Home with family, no needs    ____________________________________________________________________    Code Status: Full Code  ___________________________________________________________________      Total time in minutes spent coordinating this discharge (includes going over instructions, follow-up, prescriptions, and preparing report for sign off to her PCP) :  >30 minutes    Signed:  Leonidas Webster MD

## 2022-11-07 NOTE — PROGRESS NOTES
Transition of Care Plan:     RUR: 7%  Disposition: Home with Mother  Follow up appointments: PCP & specialists as indicated - Outpatient wound care clinic for 11/14/2022 at [address] on [address]  DME needed: None  Transportation at Discharge: Mother  [address] or means to access home: Yes     IM Medicare Letter: N/a  Is patient a  and connected with the [place]? N/a             If yes, was [employer] transfer form completed and [facility] notified? N/a  Caregiver Contact: Jaida Courtney, mother ([phone])  Discharge Caregiver contacted prior to discharge? Pt declined  Care Conference needed?:  No                Care Management Interventions  PCP Verified by CM: Yes  Palliative Care Criteria Met (RRAT>21 & CHF Dx)?: No  Mode of Transport at Discharge:  Other (see comment) (mother)  Transition of Care Consult (CM Consult): Discharge Planning  Discharge Durable Medical Equipment: No  Health Maintenance Reviewed: Yes  Physical Therapy Consult: Yes  Occupational Therapy Consult: Yes  Speech Therapy Consult: No  Support Systems: Parent(s)  Confirm Follow Up Transport: Self  The Plan for Transition of Care is Related to the Following Treatment Goals : [address] vs. [facility]  The Patient and/or Patient Representative was Provided with a Choice of Provider and Agrees with the Discharge Plan?: Yes  Name of the Patient Representative Who was Provided with a Choice of Provider and Agrees with the Discharge Plan: Scarlet Gilford (pt)  Discharge Location  Patient Expects to be Discharged to[de-identified] Home with home health    Romy Gomez RN, BSN, [facility]  Manager of Case Management  [phone]

## 2022-11-07 NOTE — PROGRESS NOTES
Pharmacy Antimicrobial Kinetic Dosing    Indication for Antimicrobials:  cellulitis, sepsis    Current Regimen of Each Antimicrobial:  Zosyn 3.375g q 8h (start: , 14 days)  Vancomycin 1750 mg IV once, then 1000 mg IV q12h (Start Date 11/3; Day # 5)    Previous Antimicrobial Therapy:  Zosyn 4.5 gm x1 dose 11/3  Cefepime 11/3-    Goal Level: AUC: 400-600 mg/hr/Liter/day    Date Dose & Interval Measured (mcg/mL) Predicted AUC/CURT    @  1000mg q 12h 9.4 327 /8.3                 Date & time of next level:     Dosing calculator used: [employer] calculator    Significant Positive Cultures:   11/3 blood: ngx4    Conditions for Dosing Consideration:     Labs:  Recent Labs     22  0755 22  0319   CREA 0.59 0.75   BUN 8 12     Recent Labs     22  0755 22  1126 22  0319   WBC 5.7 10.7 12.6*     Temp (24hrs), Av °F (36.7 °C), Min:97.6 °F (36.4 °C), Max:98.5 °F (36.9 °C)    Creatinine Clearance (mL/min):   CrCl (Adjusted Body Weight): 116.2 mL/min    Impression/Plan:   WBCs improved, renal function improved, cultures remain negative  Continue Vancomycin 1,000mg IV q8h for a predicted AUC of 445 and trough of 12.9  Antimicrobial stop date TBD     Pharmacy will follow daily and adjust medications as appropriate for renal function and/or serum levels.     Thank you,  TIANA Davenport
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
## 2022-11-07 NOTE — PROGRESS NOTES
Discharge instructions reviewed with pt and mother, both verbalized understanding. Copy given to pt. IV removed without incident. No c/o pain or distress at discharge, had already been medicated for pain. No n/v/d. Dsg CDI. Pt and mother have all of pt's belongings. Pt discharged via wheelchair with mother. Bag of wound care supplies sent with pt.

## 2022-11-09 LAB
BACTERIA SPEC CULT: NORMAL
SERVICE CMNT-IMP: NORMAL

## 2022-11-14 ENCOUNTER — HOSPITAL ENCOUNTER (OUTPATIENT)
Dept: WOUND CARE | Age: 37
Discharge: HOME | End: 2022-11-14
Payer: COMMERCIAL

## 2022-11-14 VITALS
DIASTOLIC BLOOD PRESSURE: 83 MMHG | RESPIRATION RATE: 16 BRPM | SYSTOLIC BLOOD PRESSURE: 142 MMHG | TEMPERATURE: 97.8 F | HEART RATE: 117 BPM

## 2022-11-14 PROBLEM — S51.802A: Status: ACTIVE | Noted: 2022-11-14

## 2022-11-14 PROCEDURE — 99213 OFFICE O/P EST LOW 20 MIN: CPT

## 2022-11-14 PROCEDURE — 97597 DBRDMT OPN WND 1ST 20 CM/<: CPT

## 2022-11-14 RX ORDER — SILVER SULFADIAZINE 10 G/1000G
CREAM TOPICAL ONCE
OUTPATIENT
Start: 2022-11-14 | End: 2022-11-14

## 2022-11-14 RX ORDER — MUPIROCIN 20 MG/G
OINTMENT TOPICAL ONCE
OUTPATIENT
Start: 2022-11-14 | End: 2022-11-14

## 2022-11-14 RX ORDER — BACITRACIN ZINC AND POLYMYXIN B SULFATE 500; 1000 [USP'U]/G; [USP'U]/G
OINTMENT TOPICAL ONCE
OUTPATIENT
Start: 2022-11-14 | End: 2022-11-14

## 2022-11-14 RX ORDER — LIDOCAINE HYDROCHLORIDE 20 MG/ML
JELLY TOPICAL ONCE
OUTPATIENT
Start: 2022-11-14 | End: 2022-11-14

## 2022-11-14 RX ORDER — TRIAMCINOLONE ACETONIDE 1 MG/G
OINTMENT TOPICAL ONCE
OUTPATIENT
Start: 2022-11-14 | End: 2022-11-14

## 2022-11-14 RX ORDER — CLOBETASOL PROPIONATE 0.5 MG/G
OINTMENT TOPICAL ONCE
OUTPATIENT
Start: 2022-11-14 | End: 2022-11-14

## 2022-11-14 RX ORDER — GENTAMICIN SULFATE 1 MG/G
OINTMENT TOPICAL ONCE
OUTPATIENT
Start: 2022-11-14 | End: 2022-11-14

## 2022-11-14 RX ORDER — BACITRACIN 500 [USP'U]/G
OINTMENT TOPICAL ONCE
OUTPATIENT
Start: 2022-11-14 | End: 2022-11-14

## 2022-11-14 RX ORDER — LIDOCAINE HYDROCHLORIDE 40 MG/ML
SOLUTION TOPICAL ONCE
OUTPATIENT
Start: 2022-11-14 | End: 2022-11-14

## 2022-11-14 RX ORDER — BETAMETHASONE DIPROPIONATE 0.5 MG/G
OINTMENT TOPICAL ONCE
OUTPATIENT
Start: 2022-11-14 | End: 2022-11-14

## 2022-11-14 RX ORDER — LIDOCAINE 40 MG/G
CREAM TOPICAL ONCE
OUTPATIENT
Start: 2022-11-14 | End: 2022-11-14

## 2022-11-14 RX ORDER — DULOXETIN HYDROCHLORIDE 30 MG/1
30 CAPSULE, DELAYED RELEASE ORAL DAILY
COMMUNITY
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
## 2022-11-14 NOTE — DISCHARGE INSTRUCTIONS
Discharge Instructions/Wound Orders  [address], [address] [place] [address] Giovanna Gomez, [id number]  Telephone: [phone]    NAME:  Bruce Lopez OF BIRTH:  1985  MEDICAL RECORD NUMBER:  [id number]  DATE:  11/14/2022  Wound Care Orders:  Left lower arm :Cleanse with saline , apply primary dressing Silver Alginate cover with secondary dressing   gauze and roll gauze . Pt./pcg/HH nurse to change (freq) daily and as needed for compromise. F/U in 2 week. Dietary:  [x] Diet as tolerated: [] Calorie Diabetic Diet:Low carb and no Sugar [] No Added Salt:[x] Increase Protein: [] Other:Limit the amount of liquid you are drinking and avoid drinking in between meals   Activity:  [x] Activity as tolerated:  [] Patient has no activity restrictions     [] Strict Bedrest: [] Remain off Work:     [] May return to full duty work:                                   [] Return to work with restrictions:             Return Appointment:  [x] Return Appointment: With DR Alin Sierra  in  [facility])  [] Ordered tests:    Electronically signed Surya Melgoza RN on 11/14/2022 at [address]: Should you experience any significant changes in your wound(s) or have questions about your wound care, please contact the [address] [place] [address] at [address] [place] [address] 8:00 am - 4:30. If you need help with your wound outside these hours and cannot wait until we are again available, contact your PCP or go to the hospital emergency room. PLEASE NOTE: IF YOU ARE UNABLE TO OBTAIN WOUND SUPPLIES, CONTINUE TO USE THE SUPPLIES YOU HAVE AVAILABLE UNTIL YOU ARE ABLE TO REACH US. IT IS MOST IMPORTANT TO KEEP THE WOUND COVERED AT ALL TIMES.      Physician Signature:_______________________    Date: ___________ Time:  ____________

## 2022-12-06 ENCOUNTER — APPOINTMENT (OUTPATIENT)
Dept: CT IMAGING | Age: 37
End: 2022-12-06
Attending: STUDENT IN AN ORGANIZED HEALTH CARE EDUCATION/TRAINING PROGRAM
Payer: COMMERCIAL

## 2022-12-06 ENCOUNTER — APPOINTMENT (OUTPATIENT)
Dept: VASCULAR SURGERY | Age: 37
End: 2022-12-06
Attending: STUDENT IN AN ORGANIZED HEALTH CARE EDUCATION/TRAINING PROGRAM
Payer: COMMERCIAL

## 2022-12-06 ENCOUNTER — HOSPITAL ENCOUNTER (EMERGENCY)
Age: 37
Discharge: HOME | End: 2022-12-06
Attending: STUDENT IN AN ORGANIZED HEALTH CARE EDUCATION/TRAINING PROGRAM
Payer: COMMERCIAL

## 2022-12-06 VITALS
TEMPERATURE: 98.4 F | WEIGHT: 167.99 LBS | HEART RATE: 113 BPM | DIASTOLIC BLOOD PRESSURE: 96 MMHG | RESPIRATION RATE: 18 BRPM | BODY MASS INDEX: 27.99 KG/M2 | SYSTOLIC BLOOD PRESSURE: 145 MMHG | HEIGHT: 65 IN | OXYGEN SATURATION: 100 %

## 2022-12-06 DIAGNOSIS — L03.114 CELLULITIS OF LEFT UPPER EXTREMITY: Primary | ICD-10-CM

## 2022-12-06 LAB
ALBUMIN SERPL-MCNC: 3.5 G/DL (ref 3.5–5)
ALBUMIN/GLOB SERPL: 0.9 {RATIO} (ref 1.1–2.2)
ALP SERPL-CCNC: 108 U/L (ref 45–117)
ALT SERPL-CCNC: 13 U/L (ref 12–78)
ANION GAP SERPL CALC-SCNC: 9 MMOL/L (ref 5–15)
AST SERPL-CCNC: 12 U/L (ref 15–37)
BASE EXCESS BLD CALC-SCNC: 0.1 MMOL/L
BASOPHILS # BLD: 0 K/UL (ref 0–0.1)
BASOPHILS NFR BLD: 0 % (ref 0–1)
BILIRUB SERPL-MCNC: 0.6 MG/DL (ref 0.2–1)
BUN SERPL-MCNC: 21 MG/DL (ref 6–20)
BUN/CREAT SERPL: 24 (ref 12–20)
CA-I BLD-MCNC: 1.22 MMOL/L (ref 1.12–1.32)
CALCIUM SERPL-MCNC: 9.1 MG/DL (ref 8.5–10.1)
CHLORIDE BLD-SCNC: 106 MMOL/L (ref 100–108)
CHLORIDE SERPL-SCNC: 105 MMOL/L (ref 97–108)
CO2 BLD-SCNC: 25 MMOL/L (ref 19–24)
CO2 SERPL-SCNC: 26 MMOL/L (ref 21–32)
CREAT SERPL-MCNC: 0.86 MG/DL (ref 0.55–1.02)
CREAT UR-MCNC: 0.8 MG/DL (ref 0.6–1.3)
DIFFERENTIAL METHOD BLD: ABNORMAL
EOSINOPHIL # BLD: 0 K/UL (ref 0–0.4)
EOSINOPHIL NFR BLD: 0 % (ref 0–7)
ERYTHROCYTE [DISTWIDTH] IN BLOOD BY AUTOMATED COUNT: 16.9 % (ref 11.5–14.5)
GLOBULIN SER CALC-MCNC: 3.9 G/DL (ref 2–4)
GLUCOSE BLD STRIP.AUTO-MCNC: 104 MG/DL (ref 74–106)
GLUCOSE SERPL-MCNC: 104 MG/DL (ref 65–100)
HCO3 BLDA-SCNC: 26 MMOL/L
HCT VFR BLD AUTO: 38.6 % (ref 35–47)
HGB BLD-MCNC: 12.3 G/DL (ref 11.5–16)
IMM GRANULOCYTES # BLD AUTO: 0 K/UL (ref 0–0.04)
IMM GRANULOCYTES NFR BLD AUTO: 0 % (ref 0–0.5)
LACTATE BLD-SCNC: 1.21 MMOL/L (ref 0.4–2)
LYMPHOCYTES # BLD: 1.4 K/UL (ref 0.8–3.5)
LYMPHOCYTES NFR BLD: 20 % (ref 12–49)
MCH RBC QN AUTO: 26.8 PG (ref 26–34)
MCHC RBC AUTO-ENTMCNC: 31.9 G/DL (ref 30–36.5)
MCV RBC AUTO: 84.1 FL (ref 80–99)
MONOCYTES # BLD: 0.5 K/UL (ref 0–1)
MONOCYTES NFR BLD: 6 % (ref 5–13)
NEUTS SEG # BLD: 5.3 K/UL (ref 1.8–8)
NEUTS SEG NFR BLD: 74 % (ref 32–75)
NRBC # BLD: 0 K/UL (ref 0–0.01)
NRBC BLD-RTO: 0 PER 100 WBC
PCO2 BLDV: 45.2 MMHG (ref 41–51)
PH BLDV: 7.37 [PH] (ref 7.32–7.42)
PLATELET # BLD AUTO: 196 K/UL (ref 150–400)
PMV BLD AUTO: 10.2 FL (ref 8.9–12.9)
PO2 BLDV: 26 MMHG (ref 25–40)
POTASSIUM BLD-SCNC: 4.1 MMOL/L (ref 3.5–5.5)
POTASSIUM SERPL-SCNC: 3.8 MMOL/L (ref 3.5–5.1)
PROT SERPL-MCNC: 7.4 G/DL (ref 6.4–8.2)
RBC # BLD AUTO: 4.59 M/UL (ref 3.8–5.2)
SODIUM BLD-SCNC: 140 MMOL/L (ref 136–145)
SODIUM SERPL-SCNC: 140 MMOL/L (ref 136–145)
SPECIMEN SITE: ABNORMAL
WBC # BLD AUTO: 7.2 K/UL (ref 3.6–11)

## 2022-12-06 PROCEDURE — 73201 CT UPPER EXTREMITY W/DYE: CPT

## 2022-12-06 PROCEDURE — 87040 BLOOD CULTURE FOR BACTERIA: CPT

## 2022-12-06 PROCEDURE — 99285 EMERGENCY DEPT VISIT HI MDM: CPT

## 2022-12-06 PROCEDURE — 93971 EXTREMITY STUDY: CPT

## 2022-12-06 PROCEDURE — 85025 COMPLETE CBC W/AUTO DIFF WBC: CPT

## 2022-12-06 PROCEDURE — 80053 COMPREHEN METABOLIC PANEL: CPT

## 2022-12-06 PROCEDURE — 74011000636 HC RX REV CODE- 636: Performed by: STUDENT IN AN ORGANIZED HEALTH CARE EDUCATION/TRAINING PROGRAM

## 2022-12-06 PROCEDURE — 36415 COLL VENOUS BLD VENIPUNCTURE: CPT

## 2022-12-06 PROCEDURE — 82947 ASSAY GLUCOSE BLOOD QUANT: CPT

## 2022-12-06 RX ORDER — DOXYCYCLINE HYCLATE 100 MG
100 TABLET ORAL 2 TIMES DAILY
Qty: 14 TABLET | Refills: 0 | Status: SHIPPED | OUTPATIENT
Start: 2022-12-06 | End: 2022-12-13

## 2022-12-06 RX ORDER — AMOXICILLIN AND CLAVULANATE POTASSIUM 875; 125 MG/1; MG/1
1 TABLET, FILM COATED ORAL 2 TIMES DAILY
Qty: 14 TABLET | Refills: 0 | Status: SHIPPED | OUTPATIENT
Start: 2022-12-06 | End: 2022-12-13

## 2022-12-06 RX ADMIN — IOPAMIDOL 100 ML: 755 INJECTION, SOLUTION INTRAVENOUS at 16:22
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
## 2022-12-06 NOTE — DISCHARGE INSTRUCTIONS
If your symptoms change or worsen, return to the ER as soon as possible. Please schedule follow-up for your cellulitis. Your arm should be rechecked by the end of this week. You are started on Augmentin and doxycycline.

## 2022-12-11 LAB
BACTERIA SPEC CULT: NORMAL
SERVICE CMNT-IMP: NORMAL

## 2023-01-10 ENCOUNTER — OFFICE VISIT (OUTPATIENT)
Dept: PRIMARY CARE CLINIC | Age: 38
End: 2023-01-10
Payer: COMMERCIAL

## 2023-01-10 VITALS
TEMPERATURE: 97.5 F | OXYGEN SATURATION: 99 % | BODY MASS INDEX: 28.62 KG/M2 | HEART RATE: 87 BPM | DIASTOLIC BLOOD PRESSURE: 87 MMHG | HEIGHT: 65 IN | SYSTOLIC BLOOD PRESSURE: 127 MMHG | RESPIRATION RATE: 16 BRPM | WEIGHT: 171.8 LBS

## 2023-01-10 DIAGNOSIS — F32.9 MAJOR DEPRESSIVE DISORDER, REMISSION STATUS UNSPECIFIED, UNSPECIFIED WHETHER RECURRENT: ICD-10-CM

## 2023-01-10 DIAGNOSIS — G43.009 MIGRAINE WITHOUT AURA AND WITHOUT STATUS MIGRAINOSUS, NOT INTRACTABLE: ICD-10-CM

## 2023-01-10 DIAGNOSIS — F19.11 HISTORY OF SUBSTANCE ABUSE (HCC): ICD-10-CM

## 2023-01-10 DIAGNOSIS — M41.24 OTHER IDIOPATHIC SCOLIOSIS, THORACIC REGION: Primary | ICD-10-CM

## 2023-01-10 PROCEDURE — 99214 OFFICE O/P EST MOD 30 MIN: CPT | Performed by: FAMILY MEDICINE

## 2023-01-10 RX ORDER — BUSPIRONE HYDROCHLORIDE 15 MG/1
15 TABLET ORAL 3 TIMES DAILY
COMMUNITY

## 2023-01-10 RX ORDER — HYDROXYZINE 25 MG/1
25 TABLET, FILM COATED ORAL
COMMUNITY

## 2023-01-10 NOTE — PROGRESS NOTES
HPI     Chief Complaint   Patient presents with    Establish Care     Bad migraines  Nerve pain in ankle - Back on lyrica   Eardrum check     She is a 30 y.o. female who presents to establish care. Establishing Care    Pmhx : Migraines, HTN, opiate abuse, anxiety and depression. Surghx reviewed. Sochx reviewed. ER visit 12/6/22 visit for cellulitis of forearm. Admission 11/3/22 due to LUE cellulitis, sepsis. Depression and anxiety.  passed away last year. Followed by psych at [facility]. Also seeing a counselor. Having trouble sleeping, using hydroxyzine. Lives at home with mother. Works as a . She stays active, walks a lot. Menses are regular. C/o feeling that her left clavicle is prominent. No injury or significant pain. Also c/o chronic problems with migraines. Uses excedrin prn, which does not resolve her symptoms. Headaches occurring 3-4 days per week. Know headache triggers include weather changes. Has tried propranolol, cymbalta, and other SSRI medication without decrease in her migraine frequency. No associated focal neuro symptoms.     - Chronic medical problems:  Past Medical History:   Diagnosis Date    Back injuries     Ruptured Disc    Contraception     OC's    Hypertension     Migraines     Opiate addiction ([facility] [address]     Pap smear abnormality of cervix with LGSIL 05/2016    Track marks due to intravenous drug abuse ([facility] [address]      - Current medications:   Current Outpatient Medications   Medication Sig    busPIRone (BUSPAR) 15 mg tablet Take 15 mg by mouth three (3) times daily. hydrOXYzine HCL (ATARAX) 25 mg tablet Take 25 mg by mouth nightly as needed. DULoxetine (CYMBALTA) 30 mg capsule Take 30 mg by mouth daily. No current facility-administered medications for this visit.      - Family history:   Family History   Problem Relation Age of Onset    Breast Cancer Other         MGA    Ovarian Cancer Neg Hx     Colon Cancer Neg Hx     Prostate Cancer Neg Hx     Stroke Neg Hx     Pulmonary Embolism Neg Hx     Deep Vein Thrombosis Neg Hx      - Allergies: No Known Allergies  - Surgical history:   Past Surgical History:   Procedure Laterality Date    HX HEENT      Deviated Septum    HX OTHER SURGICAL      Right Ankle    HX OTHER SURGICAL      Epidurals (back)     - Social history (sexually active, occupation, smoker, etoh use, etc):   Social History     Socioeconomic History    Marital status:      Spouse name: Not on file    Number of children: Not on file    Years of education: Not on file    Highest education level: Not on file   Occupational History    Occupation:    Tobacco Use    Smoking status: Never    Smokeless tobacco: Never   Vaping Use    Vaping Use: Never used   Substance and Sexual Activity    Alcohol use: Yes     Comment: social    Drug use: Not Currently     Comment: prior hx of opiate addiction    Sexual activity: Yes     Partners: Male   Other Topics Concern    Not on file   Social History Narrative    1. PCP [facility], Dr. Chasidy Canales        2. Trains service dogs, does Equine therapy     Social Determinants of Health     Financial Resource Strain: Low Risk     Difficulty of Paying Living Expenses: Not hard at all   Food Insecurity: No Food Insecurity    Worried About Running Out of Food in the Last Year: Never true    Ran Out of Food in the Last Year: Never true   Transportation Needs: Not on file   Physical Activity: Not on file   Stress: Not on file   Social Connections: Not on file   Intimate Partner Violence: Not on file   Housing Stability: Not on file         Review of Systems  General : Denies fever, chills, unintentional weight loss. Cardiac : Denies chest pain, shortness of breath, orthopnea, PND. Pulm : Denies coughing, hemoptysis, dyspnea. GI: Denies abd pain, vomiting, change in bowel movements, black/bloody stool. Neuro : Denies syncope or presyncope. Denies focal neuro symptoms.     Reviewed PmHx, RxHx, FmHx, SocHx, AllgHx and updated and dated in the chart. Physical Exam:  Visit Vitals  /87 (BP 1 Location: Left upper arm, BP Patient Position: Sitting)   Pulse 87   Temp 97.5 °F (36.4 °C) (Temporal)   Resp 16   Ht 5' 5\" (1.651 m)   Wt 171 lb 12.8 oz (77.9 kg)   SpO2 99%   BMI 28.59 kg/m²     Physical Exam  Vitals reviewed. Constitutional:       Appearance: Normal appearance. HENT:      Head: Normocephalic and atraumatic. Cardiovascular:      Rate and Rhythm: Normal rate and regular rhythm. Pulses: Normal pulses. Heart sounds: Normal heart sounds. Pulmonary:      Effort: Pulmonary effort is normal.      Breath sounds: Normal breath sounds. Musculoskeletal:      Cervical back: Neck supple. No tenderness. Right lower leg: No edema. Left lower leg: No edema. Comments: Thoracic spine with mild curvature, mild prominence of right posterior thoracic wall on full flexion. On exam of the clavicle there is not remarkable palpable or visible abnormality, tenderness, mass noted. Lymphadenopathy:      Cervical: No cervical adenopathy. Skin:     General: Skin is warm and dry. Comments: Left forearm with significant scarring with tissue hypertrophy and fibrosis. Neurological:      General: No focal deficit present. Mental Status: She is alert and oriented to person, place, and time. Assessment / Plan     Diagnoses and all orders for this visit:    1. Other idiopathic scoliosis, thoracic region  -     XR SPINE THORAC 3 V; Future    2. Migraine without aura and without status migrainosus, not intractable : try to identify and avoid triggers, keep headache diary. Has tried betablocker, SSRI and cymbalta without decrease in migraine frequency. May try nurtec as below and follow up on month. -     rimegepant (NURTEC) 75 mg disintegrating tablet; Take 1 Tablet by mouth every other day. 3. History of substance abuse ([address]    4.  Major depressive disorder, remission status unspecified, unspecified whether recurrent : follow up with specialist. Seek care if condition worsens. Hospital notes, results reviewed    I have discussed the diagnosis with the patient and the intended plan as seen in the above orders. I have discussed medication side effects and warnings with the patient as well.     Suki Serrano, DO

## 2023-01-10 NOTE — PROGRESS NOTES
Chief Complaint   Patient presents with    Establish Care     Bad migraines  Nerve pain in ankle - Back on lyrica   Eardrum check       There were no vitals taken for this visit. 1. Have you been to the ER, urgent care clinic since your last visit? Hospitalized since your last visit? No    2. Have you seen or consulted any other health care providers outside of the [address] [place] [address] since your last visit? Include any pap smears or colon screening. No      3. For patients aged 39-70: Has the patient had a colonoscopy / FIT/ Cologuard? NA - based on age      If the patient is female:    4. For patients aged 41-77: Has the patient had a mammogram within the past 2 years? NA - based on age or sex      11. For patients aged 21-65: Has the patient had a pap smear? Yes - Care Gap present.  Most recent result on file

## 2023-01-16 ENCOUNTER — TELEPHONE (OUTPATIENT)
Dept: PRIMARY CARE CLINIC | Age: 38
End: 2023-01-16

## 2023-01-20 ENCOUNTER — PATIENT MESSAGE (OUTPATIENT)
Dept: PRIMARY CARE CLINIC | Age: 38
End: 2023-01-20

## 2023-03-07 ENCOUNTER — APPOINTMENT (OUTPATIENT)
Dept: GENERAL RADIOLOGY | Age: 38
End: 2023-03-07
Attending: EMERGENCY MEDICINE
Payer: COMMERCIAL

## 2023-03-07 ENCOUNTER — HOSPITAL ENCOUNTER (EMERGENCY)
Age: 38
Discharge: STILL ADMITTED OR ADMINISTRATIVE | End: 2023-03-07
Attending: EMERGENCY MEDICINE
Payer: COMMERCIAL

## 2023-03-07 ENCOUNTER — APPOINTMENT (OUTPATIENT)
Dept: CT IMAGING | Age: 38
End: 2023-03-07
Attending: EMERGENCY MEDICINE
Payer: COMMERCIAL

## 2023-03-07 VITALS
WEIGHT: 171.3 LBS | HEART RATE: 100 BPM | DIASTOLIC BLOOD PRESSURE: 90 MMHG | RESPIRATION RATE: 20 BRPM | SYSTOLIC BLOOD PRESSURE: 145 MMHG | TEMPERATURE: 98.2 F | OXYGEN SATURATION: 100 % | BODY MASS INDEX: 28.54 KG/M2 | HEIGHT: 65 IN

## 2023-03-07 DIAGNOSIS — I33.9 ACUTE ENDOCARDITIS, UNSPECIFIED ENDOCARDITIS TYPE: Primary | ICD-10-CM

## 2023-03-07 LAB
ALBUMIN SERPL-MCNC: 3.3 G/DL (ref 3.5–5)
ALBUMIN/GLOB SERPL: 0.8 (ref 1.1–2.2)
ALP SERPL-CCNC: 83 U/L (ref 45–117)
ALT SERPL-CCNC: 85 U/L (ref 12–78)
AMPHET UR QL SCN: NEGATIVE
ANION GAP SERPL CALC-SCNC: 4 MMOL/L (ref 5–15)
APPEARANCE UR: ABNORMAL
APTT PPP: 26.4 SEC (ref 22.1–31)
AST SERPL-CCNC: 67 U/L (ref 15–37)
ATRIAL RATE: 112 BPM
BACTERIA URNS QL MICRO: ABNORMAL /HPF
BARBITURATES UR QL SCN: NEGATIVE
BASOPHILS # BLD: 0 K/UL (ref 0–0.1)
BASOPHILS NFR BLD: 0 % (ref 0–1)
BENZODIAZ UR QL: NEGATIVE
BILIRUB SERPL-MCNC: 0.5 MG/DL (ref 0.2–1)
BILIRUB UR QL: NEGATIVE
BNP SERPL-MCNC: 2845 PG/ML
BUN SERPL-MCNC: 9 MG/DL (ref 6–20)
BUN/CREAT SERPL: 11 (ref 12–20)
CALCIUM SERPL-MCNC: 8.9 MG/DL (ref 8.5–10.1)
CALCULATED P AXIS, ECG09: 60 DEGREES
CALCULATED R AXIS, ECG10: 58 DEGREES
CALCULATED T AXIS, ECG11: 80 DEGREES
CANNABINOIDS UR QL SCN: NEGATIVE
CAOX CRY URNS QL MICRO: ABNORMAL
CHLORIDE SERPL-SCNC: 103 MMOL/L (ref 97–108)
CO2 SERPL-SCNC: 27 MMOL/L (ref 21–32)
COCAINE UR QL SCN: NEGATIVE
COLOR UR: ABNORMAL
CREAT SERPL-MCNC: 0.79 MG/DL (ref 0.55–1.02)
CRP SERPL-MCNC: 4.15 MG/DL (ref 0–0.6)
DIAGNOSIS, 93000: NORMAL
DIFFERENTIAL METHOD BLD: NORMAL
DRUG SCRN COMMENT,DRGCM: NORMAL
EOSINOPHIL # BLD: 0 K/UL (ref 0–0.4)
EOSINOPHIL NFR BLD: 0 % (ref 0–7)
EPITH CASTS URNS QL MICRO: ABNORMAL /LPF
ERYTHROCYTE [DISTWIDTH] IN BLOOD BY AUTOMATED COUNT: 13.6 % (ref 11.5–14.5)
ERYTHROCYTE [SEDIMENTATION RATE] IN BLOOD: 44 MM/HR (ref 0–20)
GLOBULIN SER CALC-MCNC: 4.1 G/DL (ref 2–4)
GLUCOSE SERPL-MCNC: 92 MG/DL (ref 65–100)
GLUCOSE UR STRIP.AUTO-MCNC: NEGATIVE MG/DL
HCG UR QL: NEGATIVE
HCT VFR BLD AUTO: 36.4 % (ref 35–47)
HGB BLD-MCNC: 12.3 G/DL (ref 11.5–16)
HGB UR QL STRIP: ABNORMAL
IMM GRANULOCYTES # BLD AUTO: 0 K/UL (ref 0–0.04)
IMM GRANULOCYTES NFR BLD AUTO: 0 % (ref 0–0.5)
INR PPP: 1 (ref 0.9–1.1)
KETONES UR QL STRIP.AUTO: NEGATIVE MG/DL
LEUKOCYTE ESTERASE UR QL STRIP.AUTO: ABNORMAL
LYMPHOCYTES # BLD: 1.2 K/UL (ref 0.8–3.5)
LYMPHOCYTES NFR BLD: 22 % (ref 12–49)
MCH RBC QN AUTO: 28.5 PG (ref 26–34)
MCHC RBC AUTO-ENTMCNC: 33.8 G/DL (ref 30–36.5)
MCV RBC AUTO: 84.3 FL (ref 80–99)
METHADONE UR QL: NEGATIVE
MONOCYTES # BLD: 0.6 K/UL (ref 0–1)
MONOCYTES NFR BLD: 10 % (ref 5–13)
NEUTS SEG # BLD: 3.6 K/UL (ref 1.8–8)
NEUTS SEG NFR BLD: 68 % (ref 32–75)
NITRITE UR QL STRIP.AUTO: NEGATIVE
NRBC # BLD: 0 K/UL (ref 0–0.01)
NRBC BLD-RTO: 0 PER 100 WBC
OPIATES UR QL: NEGATIVE
P-R INTERVAL, ECG05: 130 MS
PCP UR QL: NEGATIVE
PH UR STRIP: 6 (ref 5–8)
PLATELET # BLD AUTO: 210 K/UL (ref 150–400)
PMV BLD AUTO: 10 FL (ref 8.9–12.9)
POTASSIUM SERPL-SCNC: 3.4 MMOL/L (ref 3.5–5.1)
PROT SERPL-MCNC: 7.4 G/DL (ref 6.4–8.2)
PROT UR STRIP-MCNC: NEGATIVE MG/DL
PROTHROMBIN TIME: 10.3 SEC (ref 9–11.1)
Q-T INTERVAL, ECG07: 336 MS
QRS DURATION, ECG06: 86 MS
QTC CALCULATION (BEZET), ECG08: 458 MS
RBC # BLD AUTO: 4.32 M/UL (ref 3.8–5.2)
RBC #/AREA URNS HPF: ABNORMAL /HPF (ref 0–5)
SODIUM SERPL-SCNC: 134 MMOL/L (ref 136–145)
SP GR UR REFRACTOMETRY: 1.02
THERAPEUTIC RANGE,PTTT: NORMAL SECS (ref 58–77)
TROPONIN I SERPL HS-MCNC: 163 NG/L (ref 0–51)
TROPONIN I SERPL HS-MCNC: 173 NG/L (ref 0–51)
UA: UC IF INDICATED,UAUC: ABNORMAL
UROBILINOGEN UR QL STRIP.AUTO: 0.2 EU/DL (ref 0.2–1)
VENTRICULAR RATE, ECG03: 112 BPM
WBC # BLD AUTO: 5.4 K/UL (ref 3.6–11)
WBC URNS QL MICRO: ABNORMAL /HPF (ref 0–4)

## 2023-03-07 PROCEDURE — 99285 EMERGENCY DEPT VISIT HI MDM: CPT

## 2023-03-07 PROCEDURE — 96368 THER/DIAG CONCURRENT INF: CPT

## 2023-03-07 PROCEDURE — 74011250636 HC RX REV CODE- 250/636: Performed by: EMERGENCY MEDICINE

## 2023-03-07 PROCEDURE — 71275 CT ANGIOGRAPHY CHEST: CPT

## 2023-03-07 PROCEDURE — 81025 URINE PREGNANCY TEST: CPT

## 2023-03-07 PROCEDURE — 84484 ASSAY OF TROPONIN QUANT: CPT

## 2023-03-07 PROCEDURE — 80053 COMPREHEN METABOLIC PANEL: CPT

## 2023-03-07 PROCEDURE — 93005 ELECTROCARDIOGRAM TRACING: CPT

## 2023-03-07 PROCEDURE — 85025 COMPLETE CBC W/AUTO DIFF WBC: CPT

## 2023-03-07 PROCEDURE — 74011000636 HC RX REV CODE- 636: Performed by: EMERGENCY MEDICINE

## 2023-03-07 PROCEDURE — 74011000258 HC RX REV CODE- 258: Performed by: EMERGENCY MEDICINE

## 2023-03-07 PROCEDURE — 85610 PROTHROMBIN TIME: CPT

## 2023-03-07 PROCEDURE — 85652 RBC SED RATE AUTOMATED: CPT

## 2023-03-07 PROCEDURE — 80307 DRUG TEST PRSMV CHEM ANLYZR: CPT

## 2023-03-07 PROCEDURE — 36415 COLL VENOUS BLD VENIPUNCTURE: CPT

## 2023-03-07 PROCEDURE — 86140 C-REACTIVE PROTEIN: CPT

## 2023-03-07 PROCEDURE — 96375 TX/PRO/DX INJ NEW DRUG ADDON: CPT

## 2023-03-07 PROCEDURE — 96366 THER/PROPH/DIAG IV INF ADDON: CPT

## 2023-03-07 PROCEDURE — 96365 THER/PROPH/DIAG IV INF INIT: CPT

## 2023-03-07 PROCEDURE — 85730 THROMBOPLASTIN TIME PARTIAL: CPT

## 2023-03-07 PROCEDURE — 81001 URINALYSIS AUTO W/SCOPE: CPT

## 2023-03-07 PROCEDURE — 87040 BLOOD CULTURE FOR BACTERIA: CPT

## 2023-03-07 PROCEDURE — 83880 ASSAY OF NATRIURETIC PEPTIDE: CPT

## 2023-03-07 RX ORDER — ONDANSETRON 2 MG/ML
4 INJECTION INTRAMUSCULAR; INTRAVENOUS
Status: COMPLETED | OUTPATIENT
Start: 2023-03-07 | End: 2023-03-07

## 2023-03-07 RX ORDER — FENTANYL CITRATE 50 UG/ML
50 INJECTION, SOLUTION INTRAMUSCULAR; INTRAVENOUS
Status: DISCONTINUED | OUTPATIENT
Start: 2023-03-07 | End: 2023-03-07

## 2023-03-07 RX ORDER — VANCOMYCIN/0.9 % SOD CHLORIDE 1.5G/250ML
1500 PLASTIC BAG, INJECTION (ML) INTRAVENOUS
Status: COMPLETED | OUTPATIENT
Start: 2023-03-07 | End: 2023-03-07

## 2023-03-07 RX ORDER — MORPHINE SULFATE 2 MG/ML
6 INJECTION, SOLUTION INTRAMUSCULAR; INTRAVENOUS
Status: COMPLETED | OUTPATIENT
Start: 2023-03-07 | End: 2023-03-07

## 2023-03-07 RX ORDER — FENTANYL CITRATE 50 UG/ML
100 INJECTION, SOLUTION INTRAMUSCULAR; INTRAVENOUS
Status: COMPLETED | OUTPATIENT
Start: 2023-03-07 | End: 2023-03-07

## 2023-03-07 RX ADMIN — VANCOMYCIN HYDROCHLORIDE 1500 MG: 10 INJECTION, POWDER, LYOPHILIZED, FOR SOLUTION INTRAVENOUS at 13:06

## 2023-03-07 RX ADMIN — CEFEPIME HYDROCHLORIDE 2 G: 2 INJECTION, POWDER, FOR SOLUTION INTRAVENOUS at 13:06

## 2023-03-07 RX ADMIN — MORPHINE SULFATE 6 MG: 2 INJECTION, SOLUTION INTRAMUSCULAR; INTRAVENOUS at 16:10

## 2023-03-07 RX ADMIN — IOPAMIDOL 100 ML: 755 INJECTION, SOLUTION INTRAVENOUS at 12:39

## 2023-03-07 RX ADMIN — ONDANSETRON 4 MG: 2 INJECTION INTRAMUSCULAR; INTRAVENOUS at 11:47

## 2023-03-07 RX ADMIN — FENTANYL CITRATE 100 MCG: 50 INJECTION, SOLUTION INTRAMUSCULAR; INTRAVENOUS at 12:00

## 2023-03-07 NOTE — PROGRESS NOTES
Chart reviewed  Noted per track board  [facility]  If patient medically unstable, please document instability  If medically stable for transfer/needing admission, please call [facility] [phone] to assist with transfer to a facility that utilizes patient's insurance for coverage. CM will attempt to verify with Registration actual [employer] policy. CM discussed with attending.     Padmini Duke [address] Management  [phone]/Available on Perfect Serve

## 2023-03-07 NOTE — ED PROVIDER NOTES
[facility] EMERGENCY DEPT  EMERGENCY DEPARTMENT ENCOUNTER       Pt Name: Any Pryor  MRN: [id number]  [place] 1985  Date of evaluation: 3/7/2023  Provider: Jean Marie Ramos DO   PCP: Michelle Yepez DO  Note Started: 9:59 AM 3/7/23     CHIEF COMPLAINT       Chief Complaint   Patient presents with    Mass     Patient reports having tender mass over the left clavicular area x2 months and started to become short of breath yesterday. She is unsure if they are related. HISTORY OF PRESENT ILLNESS: 1 or more elements      History From: patient, History limited by: none     Any Pryor is a 30 y.o. female presents to the emergency department by private vehicle for chest wall pain in addition to shortness of breath. Patient states that she had noted on a lump in her upper chest under the left clavicle approximately 2 months ago. She states that its been present since then however over the last couple days she has been having pain and discomfort in the center of her chest and has been feeling short of breath. In addition patient has noted diffuse body aches. Patient has also noted some redness and swelling of both upper limbs. She states there is no burning or itching associated with the erythema. She denies any abdominal pain, nausea, vomiting or diarrhea. Please See MDM for Additional Details of the HPI/PMH  Nursing Notes were all reviewed and agreed with or any disagreements were addressed in the HPI. REVIEW OF SYSTEMS        Positives and Pertinent negatives as per HPI.     PAST HISTORY     Past Medical History:  Past Medical History:   Diagnosis Date    Back injuries     Ruptured Disc    Contraception     OC's    Hypertension     Migraines     Opiate addiction ([facility] [address]     Pap smear abnormality of cervix with LGSIL 05/2016    Track marks due to intravenous drug abuse [facility])        Past Surgical History:  Past Surgical History:   Procedure Laterality Date    HX HEENT      Deviated Septum    HX OTHER SURGICAL      Right Ankle    HX OTHER SURGICAL      Epidurals (back)       Family History:  Family History   Problem Relation Age of Onset    Breast Cancer Other         MGA    Ovarian Cancer Neg Hx     Colon Cancer Neg Hx     Prostate Cancer Neg Hx     Stroke Neg Hx     Pulmonary Embolism Neg Hx     Deep Vein Thrombosis Neg Hx        Social History:  Social History     Tobacco Use    Smoking status: Never    Smokeless tobacco: Never   Vaping Use    Vaping Use: Never used   Substance Use Topics    Alcohol use: Yes     Comment: social    Drug use: Not Currently     Comment: prior hx of opiate addiction       Allergies:  No Known Allergies    CURRENT MEDICATIONS      Previous Medications    BUSPIRONE (BUSPAR) 15 MG TABLET    Take 15 mg by mouth three (3) times daily. DULOXETINE (CYMBALTA) 30 MG CAPSULE    Take 30 mg by mouth daily. HYDROXYZINE HCL (ATARAX) 25 MG TABLET    Take 25 mg by mouth nightly as needed. RIMEGEPANT (NURTEC) 75 MG DISINTEGRATING TABLET    Take 1 Tablet by mouth every other day. SCREENINGS               No data recorded         PHYSICAL EXAM      ED Triage Vitals   ED Encounter Vitals Group      BP 03/07/23 0928 (!) 128/99      Pulse (Heart Rate) 03/07/23 0928 (!) 125      Resp Rate 03/07/23 0928 22      Temp 03/07/23 0928 98.2 °F (36.8 °C)      Temp src --       O2 Sat (%) 03/07/23 0928 99 %      Weight 03/07/23 0929 171 lb 4.8 oz      Height 03/07/23 0929 5' 5\"        Physical Exam  Vitals and nursing note reviewed. Constitutional:       General: She is not in acute distress. Appearance: She is well-developed. She is obese. She is ill-appearing. She is not diaphoretic. HENT:      Head: Normocephalic and atraumatic. Mouth/Throat:      Mouth: Mucous membranes are moist.      Pharynx: No oropharyngeal exudate. Eyes:      Extraocular Movements: Extraocular movements intact.       Conjunctiva/sclera: Conjunctivae normal.      Pupils: Pupils are equal, round, and reactive to light. Neck:      Vascular: No JVD. Trachea: No tracheal deviation. Cardiovascular:      Rate and Rhythm: Regular rhythm. Tachycardia present. Heart sounds: Normal heart sounds. No murmur heard. Pulmonary:      Effort: Pulmonary effort is normal. No respiratory distress. Breath sounds: Normal breath sounds. No stridor. No wheezing or rales. Abdominal:      General: There is no distension. Palpations: Abdomen is soft. Tenderness: There is no abdominal tenderness. There is no guarding or rebound. Musculoskeletal:         General: No tenderness. Normal range of motion. Cervical back: Normal range of motion and neck supple. Comments: There is some edema noted to bilateral upper extremities left worse than right, patient also with diffuse erythema of the skin primarily of the upper chest and arms. There is no associated burning or pruritus with it. Skin:     General: Skin is warm and dry. Capillary Refill: Capillary refill takes less than 2 seconds. Comments: Splinter hemorrhages noted      Neurological:      Mental Status: She is alert and oriented to person, place, and time. Cranial Nerves: No cranial nerve deficit.       Comments: No gross motor or sensory deficits    Psychiatric:         Behavior: Behavior normal.      Comments: Anxious          DIAGNOSTIC RESULTS   LABS:     Recent Results (from the past 12 hour(s))   EKG, 12 LEAD, INITIAL    Collection Time: 03/07/23  9:33 AM   Result Value Ref Range    Ventricular Rate 112 BPM    Atrial Rate 112 BPM    P-R Interval 130 ms    QRS Duration 86 ms    Q-T Interval 336 ms    QTC Calculation (Bezet) 458 ms    Calculated P Axis 60 degrees    Calculated R Axis 58 degrees    Calculated T Axis 80 degrees    Diagnosis       Sinus tachycardia  Nonspecific ST and T wave abnormality  When compared with ECG of 03-NOV-2022 18:58,  T wave inversion no longer evident in Inferior leads  T wave inversion no longer evident in Lateral leads     URINALYSIS W/ REFLEX CULTURE    Collection Time: 03/07/23  9:55 AM    Specimen: Urine   Result Value Ref Range    Color YELLOW/STRAW      Appearance CLOUDY (A) CLEAR      Specific gravity 1.017      pH (UA) 6.0 5.0 - 8.0      Protein Negative NEG mg/dL    Glucose Negative NEG mg/dL    Ketone Negative NEG mg/dL    Bilirubin Negative NEG      Blood MODERATE (A) NEG      Urobilinogen 0.2 0.2 - 1.0 EU/dL    Nitrites Negative NEG      Leukocyte Esterase TRACE (A) NEG      WBC 5-10 0 - 4 /hpf    RBC  0 - 5 /hpf    Epithelial cells FEW FEW /lpf    Bacteria 4+ (A) NEG /hpf    UA:UC IF INDICATED CULTURE NOT INDICATED BY UA RESULT CNI      CA Oxalate crystals 3+ (A) NEG   HCG URINE, QL. - POC    Collection Time: 03/07/23  9:57 AM   Result Value Ref Range    Pregnancy test,urine (POC) Negative NEG     CBC WITH AUTOMATED DIFF    Collection Time: 03/07/23 11:15 AM   Result Value Ref Range    WBC 5.4 3.6 - 11.0 K/uL    RBC 4.32 3.80 - 5.20 M/uL    HGB 12.3 11.5 - 16.0 g/dL    HCT 36.4 35.0 - 47.0 %    MCV 84.3 80.0 - 99.0 FL    MCH 28.5 26.0 - 34.0 PG    MCHC 33.8 30.0 - 36.5 g/dL    RDW 13.6 11.5 - 14.5 %    PLATELET 273 260 - 488 K/uL    MPV 10.0 8.9 - 12.9 FL    NRBC 0.0 0  WBC    ABSOLUTE NRBC 0.00 0.00 - 0.01 K/uL    NEUTROPHILS 68 32 - 75 %    LYMPHOCYTES 22 12 - 49 %    MONOCYTES 10 5 - 13 %    EOSINOPHILS 0 0 - 7 %    BASOPHILS 0 0 - 1 %    IMMATURE GRANULOCYTES 0 0.0 - 0.5 %    ABS. NEUTROPHILS 3.6 1.8 - 8.0 K/UL    ABS. LYMPHOCYTES 1.2 0.8 - 3.5 K/UL    ABS. MONOCYTES 0.6 0.0 - 1.0 K/UL    ABS. EOSINOPHILS 0.0 0.0 - 0.4 K/UL    ABS. BASOPHILS 0.0 0.0 - 0.1 K/UL    ABS. IMM.  GRANS. 0.0 0.00 - 0.04 K/UL    DF AUTOMATED     TROPONIN-HIGH SENSITIVITY    Collection Time: 03/07/23 11:15 AM   Result Value Ref Range    Troponin-High Sensitivity 173 (HH) 0 - 51 ng/L   PROTHROMBIN TIME + INR    Collection Time: 03/07/23 11:15 AM   Result Value Ref Range    INR 1.0 0.9 - 1.1 Prothrombin time 10.3 9.0 - 11.1 sec   PTT    Collection Time: 03/07/23 11:15 AM   Result Value Ref Range    aPTT 26.4 22.1 - 31.0 sec    aPTT, therapeutic range     58.0 - 77.0 SECS   DRUG SCREEN, URINE    Collection Time: 03/07/23 11:15 AM   Result Value Ref Range    AMPHETAMINES Negative NEG      BARBITURATES Negative NEG      BENZODIAZEPINES Negative NEG      COCAINE Negative NEG      METHADONE Negative NEG      OPIATES Negative NEG      PCP(PHENCYCLIDINE) Negative NEG      THC (TH-CANNABINOL) Negative NEG      Drug screen comment (NOTE)    SED RATE (ESR)    Collection Time: 03/07/23 11:15 AM   Result Value Ref Range    Sed rate, automated 44 (H) 0 - 20 mm/hr   METABOLIC PANEL, COMPREHENSIVE    Collection Time: 03/07/23 11:15 AM   Result Value Ref Range    Sodium 134 (L) 136 - 145 mmol/L    Potassium 3.4 (L) 3.5 - 5.1 mmol/L    Chloride 103 97 - 108 mmol/L    CO2 27 21 - 32 mmol/L    Anion gap 4 (L) 5 - 15 mmol/L    Glucose 92 65 - 100 mg/dL    BUN 9 6 - 20 MG/DL    Creatinine 0.79 0.55 - 1.02 MG/DL    BUN/Creatinine ratio 11 (L) 12 - 20      eGFR >60 >60 ml/min/1.73m2    Calcium 8.9 8.5 - 10.1 MG/DL    Bilirubin, total 0.5 0.2 - 1.0 MG/DL    ALT (SGPT) 85 (H) 12 - 78 U/L    AST (SGOT) 67 (H) 15 - 37 U/L    Alk. phosphatase 83 45 - 117 U/L    Protein, total 7.4 6.4 - 8.2 g/dL    Albumin 3.3 (L) 3.5 - 5.0 g/dL    Globulin 4.1 (H) 2.0 - 4.0 g/dL    A-G Ratio 0.8 (L) 1.1 - 2.2     NT-PRO BNP    Collection Time: 03/07/23 11:15 AM   Result Value Ref Range    NT pro-BNP 2,845 (H) <125 PG/ML   TROPONIN-HIGH SENSITIVITY    Collection Time: 03/07/23 12:18 PM   Result Value Ref Range    Troponin-High Sensitivity 163 (HH) 0 - 51 ng/L        EKG:  If performed, independent interpretation documented below in the MDM section     RADIOLOGY:  Non-plain film images such as CT, Ultrasound and MRI are read by the radiologist. Plain radiographic images are visualized and preliminarily interpreted by the ED Provider with the findings documented in the MDM section. Interpretation per the Radiologist below, if available at the time of this note:     CTA CHEST W OR W WO CONT    Result Date: 3/7/2023  Clinical history: Sharp chest pain, dyspnea INDICATION:   Sharp chest pain, dyspnea COMPARISON: None CONTRAST: 100 ml Isovue 370 TECHNIQUE: CT of the chest with  IV contrast , Isovue-370 is performed. Axial images from the thoracic inlet to the level of the upper abdomen are obtained. Manual post-processing of the images and coronal reformatting is also performed. CT dose reduction was achieved through use of a standardized protocol tailored for this examination and automatic exposure control for dose modulation. Multiplanar reformatted imaging was performed. Sagittal and coronal reformatting. 3-D Postprocessing of imaging was performed. 3-D MIP reconstructed images were obtained in the coronal plane. FINDINGS: PE: This is a good quality study for the evaluation of pulmonary embolism to the subsegmental arterial level. There is no pulmonary embolism to this level. CORONARY VASCULAR CALCIFICATIONS: Absent There is no aortic aneurysm or dissection. Hepatic steatosis There is no pleural or pericardial effusion. There is no mediastinal, axillary or hilar lymphadenopathy. The aorta is normal in course and caliber. The proximal pulmonary arteries are without evidence of filling defects. No lytic or blastic lesions are identified. The remainder of the upper abdomen visualized is unremarkable. There is no pulmonary embolism. There is no aortic aneurysm or dissection. No acute intrathoracic process is identified. Incidental findings are as described above.           PROCEDURES   Unless otherwise noted below, none  Procedures     CRITICAL CARE TIME   CRITICAL CARE NOTE :    1:49 PM    IMPENDING DETERIORATION -Cardiovascular  ASSOCIATED RISK FACTORS - Dysrhythmia  MANAGEMENT- Bedside Assessment and Supervision of Care  INTERPRETATION -  CT Scan, ECG, Blood Pressure, Cardiac Output Measures , and labs  INTERVENTIONS - hemodynamic mngmt and IV Ab  CASE REVIEW - Medical Sub-Specialist, Nursing, and Family  TREATMENT RESPONSE -Stable  PERFORMED BY - Self    NOTES   :  I have spent 40 minutes of critical care time involved in lab review, consultations with specialist, family decision- making, bedside attention and documentation. This time excludes time spent in any separate billed procedures. During this entire length of time I was immediately available to the patient . Vanessa Cabral DO      EMERGENCY DEPARTMENT COURSE and DIFFERENTIAL DIAGNOSIS/MDM   Vitals:    Vitals:    03/07/23 1022 03/07/23 1025 03/07/23 1200 03/07/23 1248   BP: (!) 144/103  (!) 142/95    Pulse:  (!) 113 94 91   Resp:  17 13 20   Temp:       SpO2: 98% 99% 97% 100%   Weight:       Height:            Patient was given the following medications:  Medications   vancomycin (VANCOCIN) 1500 mg in  ml infusion (1,500 mg IntraVENous New Bag 3/7/23 1306)   morphine injection 6 mg (has no administration in time range)   ondansetron (ZOFRAN) injection 4 mg (4 mg IntraVENous Given 3/7/23 1147)   iopamidoL (ISOVUE-370) 370 mg iodine /mL (76 %) injection 100 mL (100 mL IntraVENous Given 3/7/23 1239)   fentaNYL citrate (PF) injection 100 mcg (100 mcg IntraVENous Given 3/7/23 1200)   cefepime (MAXIPIME) 2 g in 0.9% sodium chloride (MBP/ADV) 100 mL MBP (2 g IntraVENous New Bag 3/7/23 1306)       Medical Decision Making  Amount and/or Complexity of Data Reviewed  Labs: ordered. Radiology: ordered. ECG/medicine tests: ordered. Risk  Prescription drug management. Patient's last ED visit 3/3/2023 at [facility] secondary to opioid overdose with use of heroin. Patient has a history of left arm cellulitis requiring skin graft.     Given patient's past medical history will obtain cardiac labs in addition to a CT PE study to evaluate for possible septic emboli as well as possible endocarditis. EKG obtained at 9:33 AM interpreted by me, sinus tachycardia rate 112, normal axis/OR/QRS, NSST. No elevated white count however patient is troponin is elevated at 163 as well as proBNP of 2845. There is slight elevation of the patient's sed rate at 44 CRP pending. CT PE study performed demonstrates no acute PE or infiltrates. Given patient's presentation as well as her laboratory values I am still concerned about the possibility of infective endocarditis. We will start IV vancomycin and cefepime. Patient will need to be admitted for further evaluation and work-up. Patient has a [employer] and [facility] if [facility] are open to transfers. Consult note:  Case discussed with Dr. Rose Johnson, ED attending [facility].  They will accept the patient as a transfer ED to ED. Discussed concerns for diagnosis with patient and her mother and plan of care, Rolando Arce,     Pt is stable for transfer to [address] [place] [address] [facility] [place]     1. Acute endocarditis, unspecified endocarditis type          DISPOSITION/PLAN   Kelly Morris  results have been reviewed with her. She has been counseled regarding her diagnosis, treatment, and plan. She verbally conveys understanding and agreement of the signs, symptoms, diagnosis, treatment and prognosis and additionally agrees to follow up as discussed. She also agrees with the care-plan and conveys that all of her questions have been answered. I have also provided discharge instructions for her that include: educational information regarding their diagnosis and treatment, and list of reasons why they would want to return to the ED prior to their follow-up appointment, should her condition change. CLINICAL IMPRESSION    Transfer: The patient is being transferred to [facility] due to insurance for concern of Endocarditis.  The results of their tests and reasons for their transfer have been discussed with the patient and/or available family. The patient/family has conveyed agreement and understanding for the need to be admitted and for their admission diagnosis. Consultation has been made with Dr. Greyson Jiménez, who agrees to accept the transfer. I am the Primary Clinician of Record. Gail Bryson DO (electronically signed)    (Please note that parts of this dictation were completed with voice recognition software. Quite often unanticipated grammatical, syntax, homophones, and other interpretive errors are inadvertently transcribed by the computer software. Please disregards these errors.  Please excuse any errors that have escaped final proofreading.)

## 2023-03-12 LAB
BACTERIA SPEC CULT: NORMAL
SERVICE CMNT-IMP: NORMAL